# Patient Record
Sex: FEMALE | Race: WHITE | Employment: OTHER | URBAN - METROPOLITAN AREA
[De-identification: names, ages, dates, MRNs, and addresses within clinical notes are randomized per-mention and may not be internally consistent; named-entity substitution may affect disease eponyms.]

---

## 2017-01-30 RX ORDER — NITROGLYCERIN 0.4 MG/1
0.4 TABLET SUBLINGUAL EVERY 5 MIN PRN
Qty: 25 TABLET | Refills: 2 | Status: SHIPPED | OUTPATIENT
Start: 2017-01-30 | End: 2017-03-08 | Stop reason: SDUPTHER

## 2017-03-08 ENCOUNTER — OFFICE VISIT (OUTPATIENT)
Dept: CARDIOLOGY CLINIC | Age: 72
End: 2017-03-08

## 2017-03-08 VITALS
SYSTOLIC BLOOD PRESSURE: 174 MMHG | HEIGHT: 63 IN | DIASTOLIC BLOOD PRESSURE: 80 MMHG | WEIGHT: 139.8 LBS | BODY MASS INDEX: 24.77 KG/M2 | HEART RATE: 62 BPM

## 2017-03-08 DIAGNOSIS — I25.10 CORONARY ARTERY DISEASE INVOLVING NATIVE CORONARY ARTERY OF NATIVE HEART WITHOUT ANGINA PECTORIS: Primary | ICD-10-CM

## 2017-03-08 DIAGNOSIS — Z98.61 HISTORY OF PTCA 1: ICD-10-CM

## 2017-03-08 PROCEDURE — 99214 OFFICE O/P EST MOD 30 MIN: CPT | Performed by: INTERNAL MEDICINE

## 2017-03-08 RX ORDER — ISOSORBIDE MONONITRATE 60 MG/1
90 TABLET, EXTENDED RELEASE ORAL DAILY
Qty: 180 TABLET | Refills: 2 | Status: SHIPPED | OUTPATIENT
Start: 2017-03-08 | End: 2018-07-02 | Stop reason: SDUPTHER

## 2017-03-08 RX ORDER — ISOSORBIDE MONONITRATE 60 MG/1
90 TABLET, EXTENDED RELEASE ORAL DAILY
Qty: 90 TABLET | Refills: 2 | Status: SHIPPED
Start: 2017-03-08 | End: 2017-03-08 | Stop reason: SDUPTHER

## 2017-03-08 RX ORDER — NITROGLYCERIN 0.4 MG/1
0.4 TABLET SUBLINGUAL EVERY 5 MIN PRN
Qty: 25 TABLET | Refills: 2 | Status: SHIPPED | OUTPATIENT
Start: 2017-03-08

## 2017-12-08 ENCOUNTER — OFFICE VISIT (OUTPATIENT)
Dept: CARDIOLOGY CLINIC | Age: 72
End: 2017-12-08

## 2017-12-08 VITALS
WEIGHT: 138.4 LBS | HEART RATE: 60 BPM | DIASTOLIC BLOOD PRESSURE: 70 MMHG | BODY MASS INDEX: 24.52 KG/M2 | SYSTOLIC BLOOD PRESSURE: 148 MMHG | HEIGHT: 63 IN

## 2017-12-08 DIAGNOSIS — Z98.61 HISTORY OF PTCA 1: ICD-10-CM

## 2017-12-08 DIAGNOSIS — I25.10 CORONARY ARTERY DISEASE INVOLVING NATIVE CORONARY ARTERY OF NATIVE HEART WITHOUT ANGINA PECTORIS: Primary | ICD-10-CM

## 2017-12-08 PROCEDURE — 99214 OFFICE O/P EST MOD 30 MIN: CPT | Performed by: INTERNAL MEDICINE

## 2017-12-08 RX ORDER — SERTRALINE HYDROCHLORIDE 100 MG/1
100 TABLET, FILM COATED ORAL DAILY
COMMUNITY
End: 2018-03-22 | Stop reason: DRUGHIGH

## 2017-12-08 NOTE — PROGRESS NOTES
CARDIOLOGY NOTE      12/8/2017    RE: Dasha Ramirez  (1945)                               TO:  Dr. Jessica Celeste MD      Thank you for involving me in taking care of your  patient Dasha Ramirez, who is a  74y.o. year old      female with past medical  history of  Cad, htn, hyperlipidimea  is  seen today Patient  during this  visit c/o occasional recurrent palpitations, no CP for a few weeks. Vitals:    12/08/17 1002   BP: (!) 148/70   Pulse:        Current Outpatient Prescriptions   Medication Sig Dispense Refill    sertraline (ZOLOFT) 100 MG tablet Take 100 mg by mouth daily      nitroGLYCERIN (NITROSTAT) 0.4 MG SL tablet Place 1 tablet under the tongue every 5 minutes as needed for Chest pain 25 tablet 2    isosorbide mononitrate (IMDUR) 60 MG extended release tablet Take 1.5 tablets by mouth daily 180 tablet 2    metoprolol (LOPRESSOR) 50 MG tablet Take 1 tablet by mouth 2 times daily 90 tablet 2    alendronate (FOSAMAX) 70 MG tablet   Take 70 mg by mouth every 7 days       Omega-3 Fatty Acids (FISH OIL) 1000 MG CAPS Take 1,000 mg by mouth 2 times daily      Multiple Vitamins-Minerals (MULTIVITAMIN PO) Take 1 tablet by mouth daily      omeprazole (PRILOSEC) 40 MG capsule Take 40 mg by mouth daily.  calcium carbonate 600 MG TABS tablet Take 1 tablet by mouth 2 times daily.  hyoscyamine (LEVSIN/SL) 0.125 MG SL tablet Place 0.125 mg under the tongue every 4 hours as needed.  Bulk Chemicals (LYSINE ACETATE) by Does not apply route.  pravastatin (PRAVACHOL) 40 MG tablet Take 1 tablet by mouth daily. 90 tablet 3    aspirin 81 MG tablet Take 81 mg by mouth daily. No current facility-administered medications for this visit.       Allergies: Naproxen  Past Medical History:   Diagnosis Date    Arthritis     Blood transfusion     CAD (coronary artery disease)     H/O cardiac catheterization     3-3-2010 normal  stent present in LAD patent-rest of vessels carotid bruits. Cardiovascular  Normal S1 and S2 without obvious murmur or gallop. Extremities - No cyanosis, clubbing, or significant edema. Pulmonary  No respiratory distress. No wheezes or rales. Pulses: Bilateral radial and pedal pulses normal  Abdomen  no tenderness  Musculoskeletal  normal strength  Neurologic    There are  no gross focal neurologic abnormalities. Skin-  No rash  Affect; normal mood    DATA:  No results found for: CKTOTAL, CKMB, CKMBINDEX, TROPONINI  BNP:  No results found for: BNP  PT/INR:  No results found for: PTINR  No results found for: LABA1C  Lab Results   Component Value Date    CHOL 149 07/05/2016    TRIG 175 07/05/2016    HDL 43 07/05/2016    LDLCALC 71 07/05/2016    LDLDIRECT 73 09/25/2012     Lab Results   Component Value Date    ALT 14 07/05/2016    AST 22 07/05/2016     TSH:  No results found for: TSH      QUALITY MEASURES:  CAD:  Yes   CHOL LOWERING:  Yes- if No Why  ANTIPLATELET:  Yes - if No why  BETA BLOCKER    yes,   IF  NO WHY  SMOKING HISTORY no COUNSELLED no  ATRIAL FIBRILLATIONno ANTICOAG: no    Assessment/ Plan:     Patient seen , interviewed and examined                 -     CORONARY ARTERY DISEASE: Patient  currently as per anginal classification has   No symptoms           - changes in  treatment:   no  All CAD tests available in records reviewed. -  Hypertension: Patients blood pressure is normal. Patient is advised about low sodium diet. Present medical regimen will not be changed. -  LIPID MANAGEMENT:  Available lipid  lab data reviewed  and patient was given dietary advice. NCEP- ATP III guidelines reviewed with patient.     -   Changes  in medicines made: No        - Palpitations DC caffeine

## 2018-03-22 ENCOUNTER — OFFICE VISIT (OUTPATIENT)
Dept: CARDIOLOGY CLINIC | Age: 73
End: 2018-03-22

## 2018-03-22 VITALS
WEIGHT: 140 LBS | HEART RATE: 68 BPM | BODY MASS INDEX: 24.8 KG/M2 | DIASTOLIC BLOOD PRESSURE: 64 MMHG | SYSTOLIC BLOOD PRESSURE: 136 MMHG | HEIGHT: 63 IN

## 2018-03-22 DIAGNOSIS — R07.9 CHEST PAIN, UNSPECIFIED TYPE: Primary | ICD-10-CM

## 2018-03-22 PROCEDURE — 93000 ELECTROCARDIOGRAM COMPLETE: CPT | Performed by: INTERNAL MEDICINE

## 2018-03-22 PROCEDURE — 99214 OFFICE O/P EST MOD 30 MIN: CPT | Performed by: INTERNAL MEDICINE

## 2018-03-23 ENCOUNTER — PROCEDURE VISIT (OUTPATIENT)
Dept: CARDIOLOGY CLINIC | Age: 73
End: 2018-03-23

## 2018-03-23 DIAGNOSIS — R07.9 CHEST PAIN, UNSPECIFIED TYPE: ICD-10-CM

## 2018-03-23 LAB
LV EF: 70 %
LVEF MODALITY: NORMAL

## 2018-03-23 PROCEDURE — A9500 TC99M SESTAMIBI: HCPCS | Performed by: INTERNAL MEDICINE

## 2018-03-23 PROCEDURE — 93015 CV STRESS TEST SUPVJ I&R: CPT | Performed by: INTERNAL MEDICINE

## 2018-03-23 PROCEDURE — 78452 HT MUSCLE IMAGE SPECT MULT: CPT | Performed by: INTERNAL MEDICINE

## 2018-03-26 ENCOUNTER — TELEPHONE (OUTPATIENT)
Dept: CARDIOLOGY CLINIC | Age: 73
End: 2018-03-26

## 2018-04-25 ENCOUNTER — OFFICE VISIT (OUTPATIENT)
Dept: CARDIOLOGY CLINIC | Age: 73
End: 2018-04-25

## 2018-04-25 VITALS
WEIGHT: 142 LBS | HEART RATE: 60 BPM | HEIGHT: 63 IN | SYSTOLIC BLOOD PRESSURE: 134 MMHG | DIASTOLIC BLOOD PRESSURE: 78 MMHG | BODY MASS INDEX: 25.16 KG/M2

## 2018-04-25 DIAGNOSIS — Z98.61 HISTORY OF PTCA 1: ICD-10-CM

## 2018-04-25 DIAGNOSIS — R07.9 CHEST PAIN, UNSPECIFIED TYPE: Primary | ICD-10-CM

## 2018-04-25 PROCEDURE — 99213 OFFICE O/P EST LOW 20 MIN: CPT | Performed by: INTERNAL MEDICINE

## 2018-07-03 RX ORDER — ISOSORBIDE MONONITRATE 60 MG/1
90 TABLET, EXTENDED RELEASE ORAL DAILY
Qty: 45 TABLET | Refills: 11 | Status: SHIPPED | OUTPATIENT
Start: 2018-07-03

## 2018-09-18 LAB
ALBUMIN SERPL-MCNC: 4.8 G/DL
ALP BLD-CCNC: 68 U/L
ALT SERPL-CCNC: 17 U/L
ANION GAP SERPL CALCULATED.3IONS-SCNC: NORMAL MMOL/L
AST SERPL-CCNC: 20 U/L
BILIRUB SERPL-MCNC: 0.4 MG/DL (ref 0.1–1.4)
BUN BLDV-MCNC: 13 MG/DL
CALCIUM SERPL-MCNC: 9.4 MG/DL
CHLORIDE BLD-SCNC: 102 MMOL/L
CHOLESTEROL, TOTAL: 164 MG/DL
CHOLESTEROL/HDL RATIO: NORMAL
CO2: 26 MMOL/L
CREAT SERPL-MCNC: 0.7 MG/DL
GFR CALCULATED: NORMAL
GLUCOSE BLD-MCNC: 106 MG/DL
HDLC SERPL-MCNC: 39 MG/DL (ref 35–70)
LDL CHOLESTEROL CALCULATED: 76 MG/DL (ref 0–160)
POTASSIUM SERPL-SCNC: 4.2 MMOL/L
SODIUM BLD-SCNC: 143 MMOL/L
TOTAL PROTEIN: 6.8
TRIGL SERPL-MCNC: 245 MG/DL
VLDLC SERPL CALC-MCNC: 49 MG/DL

## 2019-01-16 ENCOUNTER — OFFICE VISIT (OUTPATIENT)
Dept: CARDIOLOGY CLINIC | Age: 74
End: 2019-01-16
Payer: COMMERCIAL

## 2019-01-16 VITALS
HEIGHT: 63 IN | WEIGHT: 143 LBS | DIASTOLIC BLOOD PRESSURE: 74 MMHG | HEART RATE: 64 BPM | BODY MASS INDEX: 25.34 KG/M2 | SYSTOLIC BLOOD PRESSURE: 122 MMHG

## 2019-01-16 DIAGNOSIS — Z98.61 HISTORY OF PTCA 1: Primary | ICD-10-CM

## 2019-01-16 PROCEDURE — 99213 OFFICE O/P EST LOW 20 MIN: CPT | Performed by: INTERNAL MEDICINE

## 2019-03-18 LAB
CHOLESTEROL, TOTAL: 1465 MG/DL
CHOLESTEROL/HDL RATIO: NORMAL
HDLC SERPL-MCNC: 38 MG/DL (ref 35–70)
LDL CHOLESTEROL CALCULATED: 94 MG/DL (ref 0–160)
TRIGL SERPL-MCNC: 167 MG/DL
VLDLC SERPL CALC-MCNC: 33 MG/DL

## 2019-04-18 NOTE — PROGRESS NOTES
1. Do not eat or drink anything after 12 midnight (or____hours) unless instructed by your doctor prior to surgery. This includes no water, chewing gum or mints. 2. Follow your directions as prescribed by the doctor for your procedure and medications. 3.Check with your Doctor regarding stopping Plavix, Coumadin, Lovenox,Effient,Pradaxa,Xarelto, Fragmin or other blood thinners and follow their instructions. 4. Do not smoke, and do not drink any alcoholic beverages 24 hours prior to surgery. This includes NA Beer. 5. You may brush your teeth and gargle the morning of surgery. DO NOT SWALLOW WATER   6. You MUST make arrangements for a responsible adult to take you home after your surgery and be able to check on you every couple hours for the day. You will not be allowed to leave alone or drive yourself home. It is strongly suggested someone stay with you the first 24 hrs. Your surgery will be cancelled if you do not have a ride home. 7. Please wear simple, loose fitting clothing to the hospital.  Gayle Majors not bring valuables (money, credit cards, checkbooks, etc.) Do not wear any makeup (including no eye makeup) or nail polish on your fingers or toes. 8. DO NOT wear any jewelry or piercings on day of surgery. All body piercing jewelry must be removed. 9. If you have dentures, they will be removed before going to the OR; we will provide you a container. If you wear contact lenses or glasses, they will be removed; please bring a case for them. 10. If you  have a Living Will and Durable Power of  for Healthcare, please bring in a copy. 11 Please bring picture ID,  insurance card, paperwork from the doctors office    (H & P, Consent, & card for implantable devices).

## 2019-05-02 NOTE — PROGRESS NOTES
.1. Hx of anesthesia complications? --N  2. Hx of difficult airway/intubation? --N  3. Hx of changed chest pain/CHF exacerbation in last 6 mo. ? --N  4. Home O2 dependent? --N  5.  Able to climb one flight of stairs w/o SOB? --Y  6. Recent COPD exacerbation or pneumonia/bronchitis that has been treated in last      month? --N

## 2019-05-02 NOTE — PROGRESS NOTES
.   1. Do not eat or drink anything after 0830 on 5/9- unless instructed by your doctor prior to surgery. This includes no water, chewing gum or mints. 2. Follow your directions as prescribed by the doctor for your procedure and medications. 3.Check with your Doctor regarding stopping Plavix, Coumadin, Lovenox,Effient,Pradaxa,Xarelto, Fragmin or other blood thinners and follow their instructions. 4. Do not smoke, and do not drink any alcoholic beverages 24 hours prior to surgery. This includes NA Beer. 5. You may brush your teeth and gargle the morning of surgery. DO NOT SWALLOW WATER   6. You MUST make arrangements for a responsible adult to take you home after your surgery and be able to check on you every couple hours for the day. You will not be allowed to leave alone or drive yourself home. It is strongly suggested someone stay with you the first 24 hrs. Your surgery will be cancelled if you do not have a ride home. 7. Please wear simple, loose fitting clothing to the hospital.  Equilla Ort not bring valuables (money, credit cards, checkbooks, etc.) Do not wear any makeup (including no eye makeup) or nail polish on your fingers or toes. 8. DO NOT wear any jewelry or piercings on day of surgery. All body piercing jewelry must be removed. 9. If you have dentures, they will be removed before going to the OR; we will provide you a container. If you wear contact lenses or glasses, they will be removed; please bring a case for them. 10. If you  have a Living Will and Durable Power of  for Healthcare, please bring in a copy. 11 Please bring picture ID,  insurance card, paperwork from the doctors office    (H & P, Consent, & card for implantable devices).

## 2019-05-07 ENCOUNTER — ANESTHESIA EVENT (OUTPATIENT)
Dept: OPERATING ROOM | Age: 74
End: 2019-05-07
Payer: COMMERCIAL

## 2019-05-07 NOTE — ANESTHESIA PRE PROCEDURE
Department of Anesthesiology  Preprocedure Note       Name:  Ramona Powers   Age:  74 y.o.  :  1945                                          MRN:  [id number]         Date:  2019      Surgeon: Joe Hicks):  Alisa Velez MD    Procedure: COLONOSCOPY DIAGNOSTIC (N/A )    Medications prior to admission:   Prior to Admission medications    Medication Sig Start Date End Date Taking? Authorizing Provider   isosorbide mononitrate (IMDUR) 60 MG extended release tablet Take 1.5 tablets by mouth daily 7/3/18   Shaji Carrington MD   nitroGLYCERIN (NITROSTAT) 0.4 MG SL tablet Place 1 tablet under the tongue every 5 minutes as needed for Chest pain 3/8/17   Shaji Carrington MD   metoprolol (LOPRESSOR) 50 MG tablet Take 1 tablet by mouth 2 times daily 16   Shaji Carrington MD   alendronate (FOSAMAX) 70 MG tablet   Take 70 mg by mouth every 7 days  5/11/15   Historical Provider, MD   Omega-3 Fatty Acids (FISH OIL) 1000 MG CAPS Take 1,000 mg by mouth 2 times daily    Historical Provider, MD   Multiple Vitamins-Minerals (MULTIVITAMIN PO) Take 1 tablet by mouth daily    Historical Provider, MD   omeprazole (PRILOSEC) 40 MG capsule Take 40 mg by mouth daily. Historical Provider, MD   calcium carbonate 600 MG TABS tablet Take 1 tablet by mouth 2 times daily. Historical Provider, MD   Bulk Chemicals (LYSINE ACETATE) by Does not apply route. Historical Provider, MD   pravastatin (PRAVACHOL) 40 MG tablet Take 1 tablet by mouth daily. 12   Shaji Carrington MD   aspirin 81 MG tablet Take 81 mg by mouth daily. Historical Provider, MD       Current medications:    No current facility-administered medications for this encounter.       Current Outpatient Medications   Medication Sig Dispense Refill    isosorbide mononitrate (IMDUR) 60 MG extended release tablet Take 1.5 tablets by mouth daily 45 tablet 11    nitroGLYCERIN (NITROSTAT) 0.4 MG SL tablet Place 1 tablet under the tongue every 5 minutes as needed for Chest pain 25 tablet 2    metoprolol (LOPRESSOR) 50 MG tablet Take 1 tablet by mouth 2 times daily 90 tablet 2    alendronate (FOSAMAX) 70 MG tablet   Take 70 mg by mouth every 7 days       Omega-3 Fatty Acids (FISH OIL) 1000 MG CAPS Take 1,000 mg by mouth 2 times daily      Multiple Vitamins-Minerals (MULTIVITAMIN PO) Take 1 tablet by mouth daily      omeprazole (PRILOSEC) 40 MG capsule Take 40 mg by mouth daily.  calcium carbonate 600 MG TABS tablet Take 1 tablet by mouth 2 times daily.  Bulk Chemicals (LYSINE ACETATE) by Does not apply route.  pravastatin (PRAVACHOL) 40 MG tablet Take 1 tablet by mouth daily. 90 tablet 3    aspirin 81 MG tablet Take 81 mg by mouth daily. Allergies: Allergies   Allergen Reactions    Naproxen Swelling       Problem List:    Patient Active Problem List   Diagnosis Code    CAD (coronary artery disease) I25.10    History of PTCA 1 Z98.61    Hyperlipidemia E78.5    H/O cardiac catheterization Z98.890    Chest pain R07.9    HTN (hypertension) I10       Past Medical History:        Diagnosis Date    Arthritis     Blood transfusion     CAD (coronary artery disease)     H/O cardiac catheterization     3-3-2010 normal  stent present in LAD patent-rest of vessels w/out disease     History of nuclear stress test 08/30/2016    cardiolite-normal,EF69%    History of PTCA 1     3- PTCA w/ Taxus stent to LAD; PTCA of diagonal vessel    HTN (hypertension)     Hx of cardiac arrhythmia     hx given by patient ? what    Hx of cardiovascular stress test 6/4/2015    lexiscan-normal,EF70%    Hx of cardiovascular stress test 03/23/2018    EF 70%  Normal study.  Hx of echocardiogram     3-2010 EF>55%; 9-2006    Hyperlipidemia     IBS (irritable bowel syndrome)     with constipation       Past Surgical History:        Procedure Laterality Date    ABDOMEN SURGERY  1960's    Exploratory Lap, cyst ruptured.     ABDOMINAL EXPLORATION SURGERY      ovarian cyst removed    BREAST ENHANCEMENT SURGERY      b/l    BREAST LUMPECTOMY  1984    left     COLONOSCOPY  2012    Normal exam    SINUS SURGERY         Social History:    Social History     Tobacco Use    Smoking status: Former Smoker     Last attempt to quit: 1984     Years since quittin.3    Smokeless tobacco: Never Used   Substance Use Topics    Alcohol use: Yes     Alcohol/week: 0.0 oz     Comment: rarely     CAFFEINE-[employer] daily                                Counseling given: Not Answered      Vital Signs (Current):   Vitals:    19 1244 19 1412   Weight: 140 lb (63.5 kg) 140 lb (63.5 kg)   Height: 5' 3\" (1.6 m) 5' 3\" (1.6 m)                                              BP Readings from Last 3 Encounters:   19 122/74   18 134/78   18 136/64       NPO Status:                                               24 hrs. Solids                       6 hrs. clears                                  BMI:   Wt Readings from Last 3 Encounters:   19 143 lb (64.9 kg)   18 142 lb (64.4 kg)   18 140 lb (63.5 kg)     Body mass index is 24.8 kg/m². CBC: No results found for: WBC, RBC, HGB, HCT, MCV, RDW, PLT    CMP:   Lab Results   Component Value Date     2018    K 4.2 2018     2018    CO2 26 2018    BUN 13 2018    CREATININE 0.7 2018    GFRAA >60 2012    LABGLOM >60 2012    GLUCOSE 106 2018    PROT 6.6 2018    CALCIUM 9.4 2018    BILITOT 0.4 2018    ALKPHOS 68 2018    AST 20 2018    ALT 17 2018       POC Tests: No results for input(s): POCGLU, POCNA, POCK, POCCL, POCBUN, POCHEMO, POCHCT in the last 72 hours.     Coags: No results found for: PROTIME, INR, APTT    HCG (If Applicable): No results found for: PREGTESTUR, PREGSERUM, HCG, HCGQUANT     ABGs: No results found for: PHART, PO2ART, TJP3QUI, QHW3BPX, BEART, K0DXQWLX     Type & Screen (If Applicable):  No results found for: [facility]    Anesthesia Evaluation  Patient summary reviewed and Nursing notes reviewed no history of anesthetic complications:   Airway: Mallampati: II  TM distance: >3 FB   Neck ROM: full  Mouth opening: > = 3 FB Dental:    (+) edentulous      Pulmonary:normal exam                              ROS comment: Former Smoker  Quit Smokin84       Cardiovascular:  Exercise tolerance: good (>4 METS),   (+) hypertension:, CAD:, CABG/stent:, hyperlipidemia      ECG reviewed        Stress test reviewed       Beta Blocker:  Dose within 24 Hrs      ROS comment: 3/23/18 Stress test  Summary   Supervising physician Dr. Kourtney Bo ischemic ECG changes with Lexiscan infusion.   This is a normal study.   No infarct or ischemia noted.   Normal EF 70 % with normal ventricular contractility.      Recommendation   Clinical Correlation      Signatures      ------------------------------------------------------------------   Electronically signed by Gely Hutchison MD   (Interpreting cardiologist) on 2018 at 16:13   ------------------------------------------------------------------    HR 62 (2018)  Sinus  Rhythm   -consider old anterior infarct. ABNORMAL        Neuro/Psych:   Negative Neuro/Psych ROS              GI/Hepatic/Renal:   (+) bowel prep,          ROS comment: Rectal Bleeding. Endo/Other:    (+) : arthritis:., .                 Abdominal:           Vascular: negative vascular ROS. Anesthesia Plan      MAC     ASA 3       Induction: intravenous. Anesthetic plan and risks discussed with patient. RACHELL Phelps - CRNA   2019    Pre Anesthesia Evaluation complete. Anesthesia plan, risks, benefits, alternatives, and personnel discussed with patient and/or legal guardian. Patient and/or legal guardian verbalized an understanding and agreed to proceed.  Anesthesia plan discussed with care team members and agreed upon.   RACHELL Patricio - CRNA  5/9/2019

## 2019-05-09 ENCOUNTER — ANESTHESIA (OUTPATIENT)
Dept: OPERATING ROOM | Age: 74
End: 2019-05-09
Payer: COMMERCIAL

## 2019-05-09 ENCOUNTER — HOSPITAL ENCOUNTER (OUTPATIENT)
Age: 74
Setting detail: OUTPATIENT SURGERY
Discharge: HOME | End: 2019-05-09
Attending: SPECIALIST | Admitting: SPECIALIST
Payer: COMMERCIAL

## 2019-05-09 VITALS
WEIGHT: 136 LBS | TEMPERATURE: 97 F | BODY MASS INDEX: 24.1 KG/M2 | OXYGEN SATURATION: 100 % | RESPIRATION RATE: 16 BRPM | HEIGHT: 63 IN | HEART RATE: 66 BPM | DIASTOLIC BLOOD PRESSURE: 91 MMHG | SYSTOLIC BLOOD PRESSURE: 140 MMHG

## 2019-05-09 VITALS — SYSTOLIC BLOOD PRESSURE: 130 MMHG | DIASTOLIC BLOOD PRESSURE: 53 MMHG | OXYGEN SATURATION: 97 %

## 2019-05-09 PROCEDURE — 7100000010 HC PHASE II RECOVERY - FIRST 15 MIN: Performed by: SPECIALIST

## 2019-05-09 PROCEDURE — 7100000011 HC PHASE II RECOVERY - ADDTL 15 MIN: Performed by: SPECIALIST

## 2019-05-09 PROCEDURE — 2709999900 HC NON-CHARGEABLE SUPPLY: Performed by: SPECIALIST

## 2019-05-09 PROCEDURE — 3609010600 HC COLONOSCOPY POLYPECTOMY SNARE/COLD BIOPSY: Performed by: SPECIALIST

## 2019-05-09 PROCEDURE — 6360000002 HC RX W HCPCS: Performed by: NURSE ANESTHETIST, CERTIFIED REGISTERED

## 2019-05-09 PROCEDURE — 3700000000 HC ANESTHESIA ATTENDED CARE: Performed by: SPECIALIST

## 2019-05-09 PROCEDURE — 3700000001 HC ADD 15 MINUTES (ANESTHESIA): Performed by: SPECIALIST

## 2019-05-09 PROCEDURE — 2580000003 HC RX 258: Performed by: SPECIALIST

## 2019-05-09 PROCEDURE — 2500000003 HC RX 250 WO HCPCS: Performed by: NURSE ANESTHETIST, CERTIFIED REGISTERED

## 2019-05-09 PROCEDURE — 88305 TISSUE EXAM BY PATHOLOGIST: CPT

## 2019-05-09 RX ORDER — LIDOCAINE HYDROCHLORIDE 20 MG/ML
INJECTION, SOLUTION EPIDURAL; INFILTRATION; INTRACAUDAL; PERINEURAL PRN
Status: DISCONTINUED | OUTPATIENT
Start: 2019-05-09 | End: 2019-05-09 | Stop reason: SDUPTHER

## 2019-05-09 RX ORDER — PROPOFOL 10 MG/ML
INJECTION, EMULSION INTRAVENOUS PRN
Status: DISCONTINUED | OUTPATIENT
Start: 2019-05-09 | End: 2019-05-09 | Stop reason: SDUPTHER

## 2019-05-09 RX ORDER — ONDANSETRON 2 MG/ML
INJECTION INTRAMUSCULAR; INTRAVENOUS PRN
Status: DISCONTINUED | OUTPATIENT
Start: 2019-05-09 | End: 2019-05-09 | Stop reason: SDUPTHER

## 2019-05-09 RX ORDER — SODIUM CHLORIDE, SODIUM LACTATE, POTASSIUM CHLORIDE, CALCIUM CHLORIDE 600; 310; 30; 20 MG/100ML; MG/100ML; MG/100ML; MG/100ML
INJECTION, SOLUTION INTRAVENOUS CONTINUOUS
Status: DISCONTINUED | OUTPATIENT
Start: 2019-05-09 | End: 2019-05-09 | Stop reason: HOSPADM

## 2019-05-09 RX ADMIN — PROPOFOL 20 MG: 10 INJECTION, EMULSION INTRAVENOUS at 12:28

## 2019-05-09 RX ADMIN — SODIUM CHLORIDE, POTASSIUM CHLORIDE, SODIUM LACTATE AND CALCIUM CHLORIDE: 600; 310; 30; 20 INJECTION, SOLUTION INTRAVENOUS at 10:43

## 2019-05-09 RX ADMIN — PROPOFOL 20 MG: 10 INJECTION, EMULSION INTRAVENOUS at 12:34

## 2019-05-09 RX ADMIN — PROPOFOL 20 MG: 10 INJECTION, EMULSION INTRAVENOUS at 12:18

## 2019-05-09 RX ADMIN — PROPOFOL 50 MG: 10 INJECTION, EMULSION INTRAVENOUS at 12:13

## 2019-05-09 RX ADMIN — PROPOFOL 20 MG: 10 INJECTION, EMULSION INTRAVENOUS at 12:26

## 2019-05-09 RX ADMIN — PROPOFOL 20 MG: 10 INJECTION, EMULSION INTRAVENOUS at 12:20

## 2019-05-09 RX ADMIN — PROPOFOL 20 MG: 10 INJECTION, EMULSION INTRAVENOUS at 12:22

## 2019-05-09 RX ADMIN — PROPOFOL 20 MG: 10 INJECTION, EMULSION INTRAVENOUS at 12:14

## 2019-05-09 RX ADMIN — ONDANSETRON 4 MG: 2 INJECTION INTRAMUSCULAR; INTRAVENOUS at 12:34

## 2019-05-09 RX ADMIN — PROPOFOL 20 MG: 10 INJECTION, EMULSION INTRAVENOUS at 12:15

## 2019-05-09 RX ADMIN — PROPOFOL 20 MG: 10 INJECTION, EMULSION INTRAVENOUS at 12:16

## 2019-05-09 RX ADMIN — PROPOFOL 20 MG: 10 INJECTION, EMULSION INTRAVENOUS at 12:31

## 2019-05-09 RX ADMIN — LIDOCAINE HYDROCHLORIDE 100 MG: 20 INJECTION, SOLUTION EPIDURAL; INFILTRATION; INTRACAUDAL; PERINEURAL at 12:13

## 2019-05-09 RX ADMIN — PROPOFOL 20 MG: 10 INJECTION, EMULSION INTRAVENOUS at 12:24

## 2019-05-09 ASSESSMENT — PAIN SCALES - GENERAL
PAINLEVEL_OUTOF10: 0
PAINLEVEL_OUTOF10: 0

## 2019-05-09 ASSESSMENT — PAIN - FUNCTIONAL ASSESSMENT: PAIN_FUNCTIONAL_ASSESSMENT: 0-10

## 2019-05-09 NOTE — PROGRESS NOTES
[phone] denies needs, head of bed up. Call light in reach. Dr Suzie Leyva provided update at bedside, friend at bedside  1302 warm blanket provided.   1306 denies needs  1318 pt dressing  8647 discharged to car

## 2019-05-09 NOTE — ANESTHESIA POSTPROCEDURE EVALUATION
Department of Anesthesiology  Postprocedure Note    Patient: Angela Back  MRN: [id number]  YOB: 1945  Date of evaluation: 5/9/2019  Time:  12:49 PM     Procedure Summary     Date:  05/09/19 Room / Location:  [place] [address] 05 / 1200 [facility] OR    Anesthesia Start:  2618 Anesthesia Stop:  9890    Procedure:  COLONOSCOPY POLYPECTOMY SNARE/COLD BIOPSY (N/A ) Diagnosis:  (Rectal Bleeding)    Surgeon:  Ashlee Keyes MD Responsible Provider:  Kin Ormond, APRN - CRNA    Anesthesia Type:  MAC ASA Status:  3          Anesthesia Type: MAC    Samira Phase I:  10    Samira Phase II:  10    Last vitals: Reviewed and per EMR flowsheets.        Anesthesia Post Evaluation    Patient location during evaluation: bedside  Patient participation: complete - patient participated  Level of consciousness: awake and alert  Pain score: 0  Airway patency: patent  Nausea & Vomiting: no nausea and no vomiting  Complications: no  Cardiovascular status: hemodynamically stable  Respiratory status: acceptable, nonlabored ventilation, spontaneous ventilation and room air  Hydration status: euvolemic

## 2019-05-09 NOTE — BRIEF OP NOTE
BRIEF COLONOSCOPY REPORT:    Pre-op Diagnosis: rectal bleeding    Post-op Diagnosis: see impression below    Anesthesia: MAC  Estimated blood loss: less than 50 cc  Implants: none  Drains: none  Complications: none  Specimens- none- or as referred to below     Impression:    1) internal hemorrhoids    2) 6 small (2-6 mm) polyps removed   3) somewhat nodular terminal ileum of doubtful significance- biopsied        Suggest:   1) The interval for repeat colonoscopy will be determined by the path report     The complete operative report (including photos) is available in the following locations:   1) soft chart now   2) report will also be scanned and can then be found by going to \"chart review\" then \"notes\" then \"op report\" or by going to [facility] review\" then \"media\" then \"op report\". For review of photos, may need to go to page 2.

## 2019-05-09 NOTE — H&P
Original H &P in soft chart. I have examined the patient immediately before the procedure and there is no change in the previous history  and physical exam, which has been reviewed. There is no history of sleep apnea, snoring, or stridor. There has been no  previous adverse experience with sedation/anesthesia. There is no increased risk for aspiration of gastric contents. The patient has been instructed that all resuscitative measures (during the operative and immediate perioperative period) will be instituted in the unlikely event that they will be needed. The patient has no pertinent past surgical or family history other than listed in the original H&P.     ASA Class: 3  AIRWAY Class: 1

## 2019-06-06 ENCOUNTER — APPOINTMENT (OUTPATIENT)
Dept: GENERAL RADIOLOGY | Age: 74
End: 2019-06-06
Payer: COMMERCIAL

## 2019-06-06 ENCOUNTER — APPOINTMENT (OUTPATIENT)
Dept: CT IMAGING | Age: 74
End: 2019-06-06
Payer: COMMERCIAL

## 2019-06-06 ENCOUNTER — HOSPITAL ENCOUNTER (EMERGENCY)
Age: 74
Discharge: HOME | End: 2019-06-07
Attending: EMERGENCY MEDICINE
Payer: COMMERCIAL

## 2019-06-06 DIAGNOSIS — R11.2 NAUSEA AND VOMITING IN ADULT: Primary | ICD-10-CM

## 2019-06-06 LAB
ALBUMIN SERPL-MCNC: 4.7 GM/DL (ref 3.4–5)
ALP BLD-CCNC: 68 IU/L (ref 40–129)
ALT SERPL-CCNC: 13 U/L (ref 10–40)
ANION GAP SERPL CALCULATED.3IONS-SCNC: 8 MMOL/L (ref 4–16)
AST SERPL-CCNC: 20 IU/L (ref 15–37)
BASOPHILS ABSOLUTE: 0 K/CU MM
BASOPHILS RELATIVE PERCENT: 0.4 % (ref 0–1)
BILIRUB SERPL-MCNC: 0.6 MG/DL (ref 0–1)
BUN BLDV-MCNC: 20 MG/DL (ref 6–23)
CALCIUM SERPL-MCNC: 9.3 MG/DL (ref 8.3–10.6)
CHLORIDE BLD-SCNC: 103 MMOL/L (ref 99–110)
CO2: 31 MMOL/L (ref 21–32)
CREAT SERPL-MCNC: 0.7 MG/DL (ref 0.6–1.1)
DIFFERENTIAL TYPE: ABNORMAL
EOSINOPHILS ABSOLUTE: 0.1 K/CU MM
EOSINOPHILS RELATIVE PERCENT: 0.6 % (ref 0–3)
GFR AFRICAN AMERICAN: >60 ML/MIN/1.73M2
GFR NON-AFRICAN AMERICAN: >60 ML/MIN/1.73M2
GLUCOSE BLD-MCNC: 121 MG/DL (ref 70–99)
HCT VFR BLD CALC: 41.9 % (ref 37–47)
HEMOGLOBIN: 13.6 GM/DL (ref 12.5–16)
IMMATURE NEUTROPHIL %: 0.1 % (ref 0–0.43)
LIPASE: 47 IU/L (ref 13–60)
LYMPHOCYTES ABSOLUTE: 0.5 K/CU MM
LYMPHOCYTES RELATIVE PERCENT: 6.3 % (ref 24–44)
MCH RBC QN AUTO: 28.7 PG (ref 27–31)
MCHC RBC AUTO-ENTMCNC: 32.5 % (ref 32–36)
MCV RBC AUTO: 88.4 FL (ref 78–100)
MONOCYTES ABSOLUTE: 0.4 K/CU MM
MONOCYTES RELATIVE PERCENT: 5.1 % (ref 0–4)
PDW BLD-RTO: 12.4 % (ref 11.7–14.9)
PLATELET # BLD: 191 K/CU MM (ref 140–440)
PMV BLD AUTO: 10.5 FL (ref 7.5–11.1)
POTASSIUM SERPL-SCNC: 4.2 MMOL/L (ref 3.5–5.1)
RBC # BLD: 4.74 M/CU MM (ref 4.2–5.4)
SEGMENTED NEUTROPHILS ABSOLUTE COUNT: 6.8 K/CU MM
SEGMENTED NEUTROPHILS RELATIVE PERCENT: 87.5 % (ref 36–66)
SODIUM BLD-SCNC: 142 MMOL/L (ref 135–145)
TOTAL IMMATURE NEUTOROPHIL: 0.01 K/CU MM
TOTAL PROTEIN: 7.4 GM/DL (ref 6.4–8.2)
WBC # BLD: 7.8 K/CU MM (ref 4–10.5)

## 2019-06-06 PROCEDURE — 83690 ASSAY OF LIPASE: CPT

## 2019-06-06 PROCEDURE — 6360000002 HC RX W HCPCS: Performed by: EMERGENCY MEDICINE

## 2019-06-06 PROCEDURE — 93005 ELECTROCARDIOGRAM TRACING: CPT | Performed by: EMERGENCY MEDICINE

## 2019-06-06 PROCEDURE — 2580000003 HC RX 258: Performed by: EMERGENCY MEDICINE

## 2019-06-06 PROCEDURE — 74018 RADEX ABDOMEN 1 VIEW: CPT

## 2019-06-06 PROCEDURE — 96375 TX/PRO/DX INJ NEW DRUG ADDON: CPT

## 2019-06-06 PROCEDURE — 6370000000 HC RX 637 (ALT 250 FOR IP): Performed by: EMERGENCY MEDICINE

## 2019-06-06 PROCEDURE — 80053 COMPREHEN METABOLIC PANEL: CPT

## 2019-06-06 PROCEDURE — 74177 CT ABD & PELVIS W/CONTRAST: CPT

## 2019-06-06 PROCEDURE — 85025 COMPLETE CBC W/AUTO DIFF WBC: CPT

## 2019-06-06 PROCEDURE — 96374 THER/PROPH/DIAG INJ IV PUSH: CPT

## 2019-06-06 PROCEDURE — 99284 EMERGENCY DEPT VISIT MOD MDM: CPT

## 2019-06-06 RX ORDER — ONDANSETRON 4 MG/1
4 TABLET, ORALLY DISINTEGRATING ORAL ONCE
Status: COMPLETED | OUTPATIENT
Start: 2019-06-06 | End: 2019-06-06

## 2019-06-06 RX ORDER — PROMETHAZINE HYDROCHLORIDE 25 MG/ML
12.5 INJECTION, SOLUTION INTRAMUSCULAR; INTRAVENOUS ONCE
Status: COMPLETED | OUTPATIENT
Start: 2019-06-06 | End: 2019-06-06

## 2019-06-06 RX ORDER — DIPHENHYDRAMINE HYDROCHLORIDE 50 MG/ML
50 INJECTION INTRAMUSCULAR; INTRAVENOUS ONCE
Status: COMPLETED | OUTPATIENT
Start: 2019-06-06 | End: 2019-06-06

## 2019-06-06 RX ORDER — METOCLOPRAMIDE HYDROCHLORIDE 5 MG/ML
10 INJECTION INTRAMUSCULAR; INTRAVENOUS ONCE
Status: COMPLETED | OUTPATIENT
Start: 2019-06-06 | End: 2019-06-06

## 2019-06-06 RX ORDER — SODIUM CHLORIDE 9 MG/ML
INJECTION, SOLUTION INTRAVENOUS CONTINUOUS
Status: DISCONTINUED | OUTPATIENT
Start: 2019-06-06 | End: 2019-06-07 | Stop reason: HOSPADM

## 2019-06-06 RX ORDER — ONDANSETRON 4 MG/1
4 TABLET, ORALLY DISINTEGRATING ORAL EVERY 8 HOURS PRN
Qty: 15 TABLET | Refills: 0 | Status: SHIPPED | OUTPATIENT
Start: 2019-06-06 | End: 2022-09-08

## 2019-06-06 RX ORDER — PROMETHAZINE HYDROCHLORIDE 25 MG/1
25 TABLET ORAL EVERY 6 HOURS PRN
Qty: 12 TABLET | Refills: 0 | Status: SHIPPED | OUTPATIENT
Start: 2019-06-06 | End: 2019-06-09

## 2019-06-06 RX ADMIN — SODIUM CHLORIDE: 9 INJECTION, SOLUTION INTRAVENOUS at 21:06

## 2019-06-06 RX ADMIN — DIPHENHYDRAMINE HYDROCHLORIDE 50 MG: 50 INJECTION INTRAMUSCULAR; INTRAVENOUS at 23:35

## 2019-06-06 RX ADMIN — ONDANSETRON 4 MG: 4 TABLET, ORALLY DISINTEGRATING ORAL at 22:33

## 2019-06-06 RX ADMIN — SODIUM CHLORIDE: 9 INJECTION, SOLUTION INTRAVENOUS at 23:32

## 2019-06-06 RX ADMIN — PROMETHAZINE HYDROCHLORIDE 12.5 MG: 25 INJECTION INTRAMUSCULAR; INTRAVENOUS at 21:07

## 2019-06-06 RX ADMIN — METOCLOPRAMIDE 10 MG: 5 INJECTION, SOLUTION INTRAMUSCULAR; INTRAVENOUS at 23:35

## 2019-06-07 VITALS
HEART RATE: 76 BPM | DIASTOLIC BLOOD PRESSURE: 74 MMHG | SYSTOLIC BLOOD PRESSURE: 151 MMHG | OXYGEN SATURATION: 97 % | WEIGHT: 140 LBS | TEMPERATURE: 98.2 F | BODY MASS INDEX: 24.8 KG/M2 | HEIGHT: 63 IN | RESPIRATION RATE: 14 BRPM

## 2019-06-07 PROCEDURE — 6360000004 HC RX CONTRAST MEDICATION: Performed by: EMERGENCY MEDICINE

## 2019-06-07 RX ADMIN — IOPAMIDOL 100 ML: 755 INJECTION, SOLUTION INTRAVENOUS at 00:00

## 2019-06-07 ASSESSMENT — ENCOUNTER SYMPTOMS
ABDOMINAL PAIN: 1
RESPIRATORY NEGATIVE: 1
COUGH: 0
SORE THROAT: 0
DIARRHEA: 0
EYES NEGATIVE: 1
NAUSEA: 1

## 2019-06-07 NOTE — ED NOTES
Pt discharge ordered, but pt states that she still feels nauseated. Dr. Dawn Hernandez informed and Zofran ODT ordered. Pt and daughter wish to speak with physician. Dr. Dawn Hernandez informed.       Ysabel Manzanares RN  06/06/19 3236

## 2019-06-07 NOTE — ED NOTES
Pt returned for CT. States her nausea had 95% subsided. Ambulatory to the bathroom without difficulty.       Terry Santiago RN  06/07/19 5635

## 2019-06-07 NOTE — ED NOTES
Medicated as ordered for nausea and second liter 0.9 NS initiated.       Fuentes Frederick RN  06/07/19 0003

## 2019-06-07 NOTE — ED PROVIDER NOTES
The history is provided by the patient. Nausea & Vomiting   Severity:  Moderate  Duration:  6 hours  Timing:  Constant  Number of daily episodes:  8  Quality:  Stomach contents  Progression:  Unchanged  Chronicity:  New  Recent urination:  Decreased  Relieved by:  Nothing  Worsened by:  Food smell, ice chips and liquids  Ineffective treatments:  None tried  Associated symptoms: abdominal pain    Associated symptoms: no arthralgias, no chills, no cough, no diarrhea, no fever, no headaches, no myalgias, no sore throat and no URI    Abdominal pain:     Location:  Generalized    Quality: cramping        Review of Systems   Constitutional: Negative. Negative for chills and fever. HENT: Negative. Negative for sore throat. Eyes: Negative. Respiratory: Negative. Negative for cough. Cardiovascular: Negative. Gastrointestinal: Positive for abdominal pain and nausea. Negative for diarrhea. Genitourinary: Negative. Musculoskeletal: Negative. Negative for arthralgias and myalgias. Skin: Negative. Neurological: Negative. Negative for headaches. All other systems reviewed and are negative. Family History   Problem Relation Age of Onset    Heart Disease Mother     Other Father         Alzheimer's    Breast Cancer Sister      Social History     Socioeconomic History    Marital status:      Spouse name: Not on file    Number of children: 3    Years of education: Not on file    Highest education level: Not on file   Occupational History    Occupation: Nurse-retired    Social Needs    Financial resource strain: Not on file    Food insecurity:     Worry: Not on file     Inability: Not on file    Transportation needs:     Medical: Not on file     Non-medical: Not on file   Tobacco Use    Smoking status: Former Smoker     Last attempt to quit: 1984     Years since quittin.4    Smokeless tobacco: Never Used   Substance and Sexual Activity    Alcohol use:  Yes Alcohol/week: 0.0 oz     Comment: rarely     CAFFEINE-[employer] daily    Drug use: No    Sexual activity: Not on file     Comment:    Lifestyle    Physical activity:     Days per week: Not on file     Minutes per session: Not on file    Stress: Not on file   Relationships    Social connections:     Talks on phone: Not on file     Gets together: Not on file     Attends Shinto service: Not on file     Active member of club or organization: Not on file     Attends meetings of clubs or organizations: Not on file     Relationship status: Not on file    Intimate partner violence:     Fear of current or ex partner: Not on file     Emotionally abused: Not on file     Physically abused: Not on file     Forced sexual activity: Not on file   Other Topics Concern    Not on file   Social History Narrative    Not on file     Past Surgical History:   Procedure Laterality Date    ABDOMEN SURGERY  9799'Q    Exploratory Lap, cyst ruptured.     ABDOMINAL EXPLORATION SURGERY      ovarian cyst removed    BREAST ENHANCEMENT SURGERY  1985    b/l    BREAST LUMPECTOMY  1984    left     COLONOSCOPY  09/14/2012    Normal exam    COLONOSCOPY  05/09/2019    polyps, grade 1 int hem, bx of t1, call in one week    COLONOSCOPY N/A 5/9/2019    COLONOSCOPY POLYPECTOMY SNARE/COLD BIOPSY performed by Zenai Herring MD at [place] [address]       Past Medical History:   Diagnosis Date    Arthritis     Blood transfusion     CAD (coronary artery disease)     H/O cardiac catheterization     3-3-2010 normal  stent present in LAD patent-rest of vessels w/out disease     History of nuclear stress test 08/30/2016    cardiolite-normal,EF69%    History of PTCA 1     3- PTCA w/ Taxus stent to LAD; PTCA of diagonal vessel    HTN (hypertension)     Hx of cardiac arrhythmia     hx given by patient ? what    Hx of cardiovascular stress test 6/4/2015    lexiscan-normal,EF70%    Hx of cardiovascular stress test 03/23/2018    EF 70%  Normal study.  Hx of echocardiogram     3-2010 EF>55%; 9-2006    Hyperlipidemia     IBS (irritable bowel syndrome)     with constipation     Allergies   Allergen Reactions    Naproxen Swelling     Prior to Admission medications    Medication Sig Start Date End Date Taking? Authorizing Provider   promethazine (PHENERGAN) 25 MG tablet Take 1 tablet by mouth every 6 hours as needed for Nausea 6/6/19 6/9/19 Yes Crissy Laurie Ray, DO   ondansetron (ZOFRAN ODT) 4 MG disintegrating tablet Take 1 tablet by mouth every 8 hours as needed for Nausea 6/6/19  Yes Crissykim Sullivan Ray, DO   isosorbide mononitrate (IMDUR) 60 MG extended release tablet Take 1.5 tablets by mouth daily 7/3/18   Jesse Blood MD   nitroGLYCERIN (NITROSTAT) 0.4 MG SL tablet Place 1 tablet under the tongue every 5 minutes as needed for Chest pain 3/8/17   Jesse Blood MD   metoprolol (LOPRESSOR) 50 MG tablet Take 1 tablet by mouth 2 times daily 5/25/16   Jesse Blood MD   alendronate (FOSAMAX) 70 MG tablet   Take 70 mg by mouth every 7 days  5/11/15   Historical Provider, MD   Omega-3 Fatty Acids (FISH OIL) 1000 MG CAPS Take 1,000 mg by mouth 2 times daily    Historical Provider, MD   Multiple Vitamins-Minerals (MULTIVITAMIN PO) Take 1 tablet by mouth daily    Historical Provider, MD   omeprazole (PRILOSEC) 40 MG capsule Take 40 mg by mouth daily. Historical Provider, MD   calcium carbonate 600 MG TABS tablet Take 1 tablet by mouth 2 times daily. Historical Provider, MD   [employer] (LYSINE ACETATE) by Does not apply route. Historical Provider, MD   pravastatin (PRAVACHOL) 40 MG tablet Take 1 tablet by mouth daily. 12/7/12   Jesse Blood MD   aspirin 81 MG tablet Take 81 mg by mouth daily.       Historical Provider, MD       BP (!) 165/78   Pulse 76   Temp 98.2 °F (36.8 °C) (Oral)   Resp 16   Ht 5' 3\" (1.6 m)   Wt 140 lb (63.5 kg)   LMP 01/01/1998 (Approximate)   SpO2 97%   BMI 24.80 kg/m²     Physical Exam Constitutional: She is oriented to person, place, and time. She appears well-developed and well-nourished. HENT:   Head: Normocephalic and atraumatic. Right Ear: External ear normal.   Left Ear: External ear normal.   Nose: Nose normal.   Mouth/Throat: Oropharynx is clear and moist.   Eyes: Pupils are equal, round, and reactive to light. Conjunctivae and EOM are normal.   Neck: Normal range of motion. Neck supple. Cardiovascular: Normal rate, regular rhythm, normal heart sounds and intact distal pulses. Pulmonary/Chest: Effort normal and breath sounds normal.   Abdominal: Soft. Bowel sounds are normal. She exhibits distension. She exhibits no mass. There is no tenderness. There is no rebound and no guarding. Musculoskeletal: Normal range of motion. Neurological: She is alert and oriented to person, place, and time. GCS eye subscore is 4. GCS verbal subscore is 5. GCS motor subscore is 6. Skin: Skin is warm and dry. Psychiatric: She has a normal mood and affect.  Her behavior is normal. Judgment and thought content normal.       MDM:    Results for orders placed or performed during the hospital encounter of 06/06/19   CBC Auto Differential   Result Value Ref Range    WBC 7.8 4.0 - 10.5 K/CU MM    RBC 4.74 4.2 - 5.4 M/CU MM    Hemoglobin 13.6 12.5 - 16.0 GM/DL    Hematocrit 41.9 37 - 47 %    MCV 88.4 78 - 100 FL    MCH 28.7 27 - 31 PG    MCHC 32.5 32.0 - 36.0 %    RDW 12.4 11.7 - 14.9 %    Platelets 934 466 - 762 K/CU MM    MPV 10.5 7.5 - 11.1 FL    Differential Type AUTOMATED DIFFERENTIAL     Segs Relative 87.5 (H) 36 - 66 %    Lymphocytes % 6.3 (L) 24 - 44 %    Monocytes % 5.1 (H) 0 - 4 %    Eosinophils % 0.6 0 - 3 %    Basophils % 0.4 0 - 1 %    Segs Absolute 6.8 K/CU MM    Lymphocytes # 0.5 K/CU MM    Monocytes # 0.4 K/CU MM    Eosinophils # 0.1 K/CU MM    Basophils # 0.0 K/CU MM    Immature Neutrophil % 0.1 0 - 0.43 %    Total Immature Neutrophil 0.01 K/CU MM   Comprehensive Metabolic Panel

## 2019-06-08 PROCEDURE — 93010 ELECTROCARDIOGRAM REPORT: CPT | Performed by: INTERNAL MEDICINE

## 2019-06-11 LAB
EKG ATRIAL RATE: 74 BPM
EKG DIAGNOSIS: NORMAL
EKG P AXIS: 35 DEGREES
EKG P-R INTERVAL: 196 MS
EKG Q-T INTERVAL: 394 MS
EKG QRS DURATION: 76 MS
EKG QTC CALCULATION (BAZETT): 437 MS
EKG R AXIS: -9 DEGREES
EKG T AXIS: 40 DEGREES
EKG VENTRICULAR RATE: 74 BPM

## 2019-12-02 ENCOUNTER — OFFICE VISIT (OUTPATIENT)
Dept: CARDIOLOGY CLINIC | Age: 74
End: 2019-12-02
Payer: COMMERCIAL

## 2019-12-02 VITALS
BODY MASS INDEX: 24.27 KG/M2 | WEIGHT: 137 LBS | DIASTOLIC BLOOD PRESSURE: 80 MMHG | SYSTOLIC BLOOD PRESSURE: 120 MMHG | HEIGHT: 63 IN | HEART RATE: 80 BPM

## 2019-12-02 DIAGNOSIS — Z98.61 HISTORY OF PTCA 1: Primary | ICD-10-CM

## 2019-12-02 PROCEDURE — 99214 OFFICE O/P EST MOD 30 MIN: CPT | Performed by: INTERNAL MEDICINE

## 2020-12-01 ENCOUNTER — OFFICE VISIT (OUTPATIENT)
Dept: CARDIOLOGY CLINIC | Age: 75
End: 2020-12-01
Payer: COMMERCIAL

## 2020-12-01 VITALS
DIASTOLIC BLOOD PRESSURE: 90 MMHG | HEART RATE: 64 BPM | WEIGHT: 140.6 LBS | SYSTOLIC BLOOD PRESSURE: 162 MMHG | HEIGHT: 63 IN | BODY MASS INDEX: 24.91 KG/M2

## 2020-12-01 PROCEDURE — 93000 ELECTROCARDIOGRAM COMPLETE: CPT | Performed by: INTERNAL MEDICINE

## 2020-12-01 PROCEDURE — 99214 OFFICE O/P EST MOD 30 MIN: CPT | Performed by: INTERNAL MEDICINE

## 2020-12-01 NOTE — LETTER
Jericho Sanchez  1945  [id number]    Have you had any Chest Pain that is not new? - Yes  If Yes DO EKG - How does it feel - Dull Ache   How long does the pain last - 4 minutes    How long have you been having the pain - Day's   Did you take a Nitro   And did it relieve the pain - Yes    ? DO EKG IF: Patient has a Heart Rate above 100 or below 40     CAD (Coronary Artery Disease) patient should have one on file every 6 months     Have you had any Shortness of Breath - Yes  If Yes - When at rest    Have you had any dizziness - Yes, ongoing, occasional, unchanged  If Yes DO ORTHOSTATIC BP - when do you feel dizzy occasional while standing   How long does it last .1  minutes     ? Sitting wait 5 minutes do supine (laying down) wait 5 minutes then do standing - log each in \"vitals\" area in Epic  ? Be sure to ask what symptoms they are having if they get dizzy while completing ortho stats such as room spinning, nausea, etc.    Have you had any palpitations that are not new? - Yes, ongoing, occasional, unchanged  If Yes DO EKG - Do you feel your heart fluttering  How long does it last - .2  minutes     Is the patient on any of the following medications - NONE  If Yes DO EKG - Needs done every 6 months    Do you have any edema - swelling in NONE    Do you have a surgery or procedure scheduled in the near future - No  ? If Yes- DO EKG  ?   ? Ask patient if they want to sign up for MyChart if they are not already signed up    ? Check to see if we have an E-MAIL on file for the patient    ? Check medication list thoroughly!!! AND RECONCILE OUTSIDE MEDICATIONS  If dose has changed change the entire order not just the MG  BE SURE TO ASK PATIENT IF THEY NEED MEDICATION REFILLS    ?  At check out add to every patient's \"wrap up\" the following dot phrase AFTERHOURSEDUCATION and ensure we explain this to our patients

## 2020-12-01 NOTE — PATIENT INSTRUCTIONS
Please be informed that if you contact our office outside of normal business hours the physician on call cannot help with any scheduling or rescheduling issues, procedure instruction questions or any type of medication issue. We advise you for any urgent/emergency that you go to the nearest emergency room! PLEASE CALL OUR OFFICE DURING NORMAL BUSINESS HOURS    Monday - Friday   8 am to 5 pm    [place] [address]: [phone]    [place]:  [phone]      Please hold on to these instructions the  will call you within 1-9 business days when we receive authorization from your insurance. Nuclear Stress Test    WHAT TO EXPECT:   ? You will need to confirm the test or it could be cancelled. ? This test will take approximately 2 hours: 1 hour in the AM &    1 hour in the PM. You will be given a time by the Technologist after the first part is completed to come back. ? You will be given a medication, through an IV in the hand, this will safely simulate exercise. This IV is also needed to inject the radioactive isotope unless you are able toe walk the treadmill. ? You will receive an injection in the AM & PM before the pictures. ? Using a special camera, you will have one set of pictures of your heart taken in the AM and a set of pictures in the PM.     PREPARATION FOR TEST:  ? Eat a light breakfast such as water or juice and toast.  ? If you are DIABETIC: Eat a normal breakfast with NO CAFFEINE and take your insulin as normal.   ? AVOID ALL FOODS & DRINKS containing CAFFEINE 12 HOURS PRIOR TO THE TEST: Including coffee, Tea, Ana and other soft drinks even those labeled  caffeine free or decaffeinated.  ? HOLD THESE MEDICATIONS Persantine & Theophylline (Theodur)  24 hours prior & bring your inhaler with you.    The physician will specify if the following Beta blockers need to be held for 24 hours prior to test: Toprol XL (metoprolol ER)

## 2020-12-01 NOTE — PROGRESS NOTES
CARDIOLOGY NOTE      12/1/2020    RE: Penelope Wallace  (1945)                               TO:  Dr. Abdullahi Bravo MD            CHIEF COMPLAINT   Aris is a 66 y.o. female who was seen today for management of  cad                                    HPI:                   The patient has cardiac complaints of mild mid sternal recurrent CP with moderate  took NTG with relief. For a few weeks, had lad pci in past  Patient also seen  for    - Coronary artery disease, has chest pain. Patient is  compliant with prescribed medicines. - Hypertension,is  well controlled, pt is  compliant with medicines  - Hyperlipidimea, importance of hyperlipidimea discussed with pt. Penelope Wallace has the following history recorded in care path:  Patient Active Problem List    Diagnosis Date Noted    HTN (hypertension)      Priority: Low    Chest pain 06/07/2013     Priority: Low    CAD (coronary artery disease)      Priority: Low    History of PTCA 1      Priority: Low    Hyperlipidemia      Priority: Low    H/O cardiac catheterization      Priority: Low     Current Outpatient Medications   Medication Sig Dispense Refill    ondansetron (ZOFRAN ODT) 4 MG disintegrating tablet Take 1 tablet by mouth every 8 hours as needed for Nausea 15 tablet 0    isosorbide mononitrate (IMDUR) 60 MG extended release tablet Take 1.5 tablets by mouth daily 45 tablet 11    nitroGLYCERIN (NITROSTAT) 0.4 MG SL tablet Place 1 tablet under the tongue every 5 minutes as needed for Chest pain 25 tablet 2    metoprolol (LOPRESSOR) 50 MG tablet Take 1 tablet by mouth 2 times daily 90 tablet 2    alendronate (FOSAMAX) 70 MG tablet   Take 70 mg by mouth every 7 days       Omega-3 Fatty Acids (FISH OIL) 1000 MG CAPS Take 1,000 mg by mouth 2 times daily      Multiple Vitamins-Minerals (MULTIVITAMIN PO) Take 1 tablet by mouth daily      omeprazole (PRILOSEC) 40 MG capsule Take 40 mg by mouth daily.       calcium carbonate 600  Smokeless tobacco: Never Used   Substance Use Topics    Alcohol use: Yes     Alcohol/week: 0.0 standard drinks     Comment: rarely     CAFFEINE-[employer]oca [employer]a daily      Review of Systems:    Constitutional: Negative for diaphoresis and fatigue  Psychological:Negative for anxiety or depression  HENT: Negative for headaches, nasal congestion, sinus pain or vertigo  Eyes: Negative for visual disturbance. Endocrine: Negative for polydipsia/polyuria  Respiratory: Negative for shortness of breath  Cardiovascular:  CP  Gastrointestinal: Negative for abdominal pain or heartburn  Genito-Urinary: Negative for urinary frequency/urgency  Musculoskeletal: Negative for muscle pain, muscular weakness, negative for pain in arm and leg or swelling in foot and leg  Neurological: Negative for dizziness, headaches, memory loss, numbness/tingling, visual changes, syncope  Dermatological: Negative for rash    Objective:    Vitals:    12/01/20 0928 12/01/20 0935   BP: (!) 160/98 (!) 162/90   Site: Left Upper Arm Left Upper Arm   Position: Sitting Sitting   Cuff Size: Medium Adult Medium Adult   Pulse: 64    Weight: 140 lb 9.6 oz (63.8 kg)    Height: 5' 3\" (1.6 m)      BP (!) 162/90 (Site: Left Upper Arm, Position: Sitting, Cuff Size: Medium Adult)   Pulse 64   Ht 5' 3\" (1.6 m)   Wt 140 lb 9.6 oz (63.8 kg)   LMP 01/01/1998 (Approximate)   BMI 24.91 kg/m²     No flowsheet data found. Wt Readings from Last 3 Encounters:   12/01/20 140 lb 9.6 oz (63.8 kg)   12/02/19 137 lb (62.1 kg)   06/06/19 140 lb (63.5 kg)     Body mass index is 24.91 kg/m². GENERAL - Alert, oriented, pleasant, in no apparent distress. EYES: No jaundice, no conjunctival pallor. SKIN: It is warm & dry. No rashes. No Echhymosis    HEENT - No clinically significant abnormalities seen. Neck - Supple. No jugular venous distention noted. No carotid bruits. Cardiovascular - Normal S1 and S2 without obvious murmur or gallop.     Extremities - No cyanosis, clubbing, or significant edema. Pulmonary - No respiratory distress. No wheezes or rales. Abdomen - No masses, tenderness, or organomegaly. Musculoskeletal - No significant edema. No joint deformities. No muscle wasting. Neurologic - Cranial nerves II through XII are grossly intact. There were no gross focal neurologic abnormalities. Lab Review   No results found for: CKTOTAL, CKMB, CKMBINDEX, TROPONINT  BNP:  No results found for: BNP  PT/INR:  No results found for: INR  No results found for: LABA1C  Lab Results   Component Value Date    WBC 7.8 06/06/2019    HCT 41.9 06/06/2019    MCV 88.4 06/06/2019     06/06/2019     Lab Results   Component Value Date    CHOL 1465 03/18/2019    TRIG 167 03/18/2019    HDL 38 03/18/2019    LDLCALC 94 03/18/2019    LDLDIRECT 73 09/25/2012     Lab Results   Component Value Date    ALT 13 06/06/2019    AST 20 06/06/2019     BMP:    Lab Results   Component Value Date     06/06/2019    K 4.2 06/06/2019     06/06/2019    CO2 31 06/06/2019    BUN 20 06/06/2019    CREATININE 0.7 06/06/2019     CMP:   Lab Results   Component Value Date     06/06/2019    K 4.2 06/06/2019     06/06/2019    CO2 31 06/06/2019    BUN 20 06/06/2019    PROT 7.4 06/06/2019    PROT 6.6 03/21/2018     TSH:  No results found for: TSH, TSHHS        Impression:    1. History of PTCA 1       Patient Active Problem List   Diagnosis Code    CAD (coronary artery disease) I25.10    History of PTCA 1 Z98.61    Hyperlipidemia E78.5    H/O cardiac catheterization Z98.890    Chest pain R07.9    HTN (hypertension) I10       Assessment & Plan:         - old [facility] dw pt      -     CORONARY ARTERY DISEASE:  symptomatic     All available  tests in chart reviewed. Management discussed . Testing ordered  yes, stress cardiolite                                 -  Hypertension: Patients blood pressure is normal. Patient is advised about low sodium diet.  Present medical regimen will not be changed. -  LIPID MANAGEMENT:  Importance of lipid levels discussed with patient   and patient was given dietary advice. NCEP- ATP III guidelines reviewed with patient. -   Changes  in medicines made:  No                                        Porfirio aDvenport MD    [facility] [place]

## 2020-12-30 ENCOUNTER — PROCEDURE VISIT (OUTPATIENT)
Dept: CARDIOLOGY CLINIC | Age: 75
End: 2020-12-30
Payer: COMMERCIAL

## 2020-12-30 LAB
LV EF: 60 %
LVEF MODALITY: NORMAL

## 2020-12-30 PROCEDURE — 93016 CV STRESS TEST SUPVJ ONLY: CPT | Performed by: INTERNAL MEDICINE

## 2020-12-30 PROCEDURE — A9500 TC99M SESTAMIBI: HCPCS | Performed by: INTERNAL MEDICINE

## 2020-12-30 PROCEDURE — 93017 CV STRESS TEST TRACING ONLY: CPT | Performed by: INTERNAL MEDICINE

## 2020-12-30 PROCEDURE — 78452 HT MUSCLE IMAGE SPECT MULT: CPT | Performed by: INTERNAL MEDICINE

## 2020-12-30 PROCEDURE — 93018 CV STRESS TEST I&R ONLY: CPT | Performed by: INTERNAL MEDICINE

## 2020-12-30 NOTE — PROGRESS NOTES
10:44 AM    12/30/20    Site: antecubital right, condition patent and no redness    # of attempts: 1

## 2021-01-04 ENCOUNTER — TELEPHONE (OUTPATIENT)
Dept: CARDIOLOGY CLINIC | Age: 76
End: 2021-01-04

## 2021-01-04 NOTE — TELEPHONE ENCOUNTER
Unable to reach patient, left voicemail of results. Conclusions        Summary    Normal tracer uptake in all segments of myocardium on stress ans rest    images. Normal Stress nuclear scintigraphic study suggestive of normal    myocardial perfusion. Gated images demonstrate normal left ventricular    systolic function with EF of 60 %.         Recommendation    Continue present medical therapy and followup in office as scheduled.

## 2021-02-18 ENCOUNTER — TELEPHONE (OUTPATIENT)
Dept: CARDIOLOGY CLINIC | Age: 76
End: 2021-02-18

## 2021-02-18 NOTE — TELEPHONE ENCOUNTER
LM to let patient know we need to reschedule her appointment 3/5/2021 to a different day, told pt to call us to make new appointment.

## 2021-02-26 ENCOUNTER — OFFICE VISIT (OUTPATIENT)
Dept: CARDIOLOGY CLINIC | Age: 76
End: 2021-02-26
Payer: COMMERCIAL

## 2021-02-26 VITALS
DIASTOLIC BLOOD PRESSURE: 70 MMHG | HEART RATE: 68 BPM | BODY MASS INDEX: 24.63 KG/M2 | WEIGHT: 139 LBS | HEIGHT: 63 IN | SYSTOLIC BLOOD PRESSURE: 118 MMHG

## 2021-02-26 DIAGNOSIS — Z98.61 HISTORY OF PTCA 1: Primary | ICD-10-CM

## 2021-02-26 PROCEDURE — 99213 OFFICE O/P EST LOW 20 MIN: CPT | Performed by: INTERNAL MEDICINE

## 2021-02-26 NOTE — LETTER
Jose Galdamez  1945  [id number]    Have you had any Chest Pain that is not new? - No        Have you had any Shortness of Breath - No     Have you had any dizziness - No      Have you had any palpitations that are not new? - No      If Yes DO EKG - Needs done every 6 months    Do you have any edema - swelling in No      Vein \"LEG PROBLEM Questionnaire\"  1. Do you have prominent leg veins? No   2. Do you have any skin discoloration? No  3. Do you have any healed or active sores? No  4. Do you have swelling of the legs? No  5. Do you have a family history of varicose veins? No  6. Does your profession involve pro-longed        standing or heavy lifting? No  7. Have you been fighting overweight problems? No  8. Do you have restless legs? Yes  9. Do you have any night time cramps? Yes  10.  Do you have any of the following in your legs:      No      Do you have a surgery or procedure scheduled in the near future - No

## 2021-02-26 NOTE — PROGRESS NOTES
Finn Handy  is a  Established patient  ,66 y.o.   female here for evaluation of the following chief complaint(s):    cad        SUBJECTIVE/OBJECTIVE:  HPI : h/o  Cad, htn, hyperlipidimea now here  Has no cardiac complains    Review of Systems    neg    Vitals:    02/26/21 0854   BP: 118/70   Pulse: 68   Weight: 139 lb (63 kg)   Height: 5' 3\" (1.6 m)     /70   Pulse 68   Ht 5' 3\" (1.6 m)   Wt 139 lb (63 kg)   LMP 01/01/1998 (Approximate)   BMI 24.62 kg/m²   No flowsheet data found. Wt Readings from Last 3 Encounters:   02/26/21 139 lb (63 kg)   12/01/20 140 lb 9.6 oz (63.8 kg)   12/02/19 137 lb (62.1 kg)     Body mass index is 24.62 kg/m². Physical Exam     Neck: JVD      Lungs : clear    Cardio : Si and S2 audilble      Ext: edema      All pertinent data reviewed      Meds : reviewed         Tests ordered        ASSESSMENT/PLAN:               -     CORONARY ARTERY DISEASE:  asymptomatic     All available  tests in chart reviewed. Management discussed . Testing ordered  no                                 -  Hypertension: Patients blood pressure is normal. Patient is advised about low sodium diet. Present medical regimen will not be changed. -  LIPID MANAGEMENT:  Importance of lipid levels discussed with patient   and patient was given dietary advice. NCEP- ATP III guidelines reviewed with patient. -   Changes  in medicines made: No                                    An electronic signature was used to authenticate this note.     --Jhonny Pickett MD

## 2021-08-20 ENCOUNTER — OFFICE VISIT (OUTPATIENT)
Dept: CARDIOLOGY CLINIC | Age: 76
End: 2021-08-20
Payer: COMMERCIAL

## 2021-08-20 VITALS
DIASTOLIC BLOOD PRESSURE: 80 MMHG | BODY MASS INDEX: 22.29 KG/M2 | SYSTOLIC BLOOD PRESSURE: 140 MMHG | HEART RATE: 56 BPM | HEIGHT: 63 IN | OXYGEN SATURATION: 99 % | WEIGHT: 125.8 LBS

## 2021-08-20 DIAGNOSIS — Z98.61 HISTORY OF PTCA 1: Primary | ICD-10-CM

## 2021-08-20 PROCEDURE — 93000 ELECTROCARDIOGRAM COMPLETE: CPT | Performed by: INTERNAL MEDICINE

## 2021-08-20 PROCEDURE — 99214 OFFICE O/P EST MOD 30 MIN: CPT | Performed by: INTERNAL MEDICINE

## 2021-08-20 RX ORDER — LISINOPRIL 5 MG/1
2.5 TABLET ORAL DAILY
COMMUNITY

## 2021-08-20 NOTE — PROGRESS NOTES
Michael Espino  is a  Established patient  ,66 y.o.   female here for evaluation of the following chief complaint(s):    cad        SUBJECTIVE/OBJECTIVE:  HPI : h/o  Cad, htn, hyperlipidimea now here  Has no cardiac complains  But has occasional SOB    Review of Systems     SOB    Vitals:    08/20/21 0926 08/20/21 0935   BP: (!) 150/88 (!) 140/80   Site: Left Upper Arm Left Upper Arm   Position: Sitting Sitting   Cuff Size: Medium Adult Medium Adult   Pulse: 64 56   SpO2: 99%    Weight: 125 lb 12.8 oz (57.1 kg)    Height: 5' 2.5\" (1.588 m)      BP (!) 140/80 (Site: Left Upper Arm, Position: Sitting, Cuff Size: Medium Adult)   Pulse 56   Ht 5' 2.5\" (1.588 m)   Wt 125 lb 12.8 oz (57.1 kg)   LMP 01/01/1998 (Approximate)   SpO2 99%   BMI 22.64 kg/m²   No flowsheet data found. Wt Readings from Last 3 Encounters:   08/20/21 125 lb 12.8 oz (57.1 kg)   02/26/21 139 lb (63 kg)   12/01/20 140 lb 9.6 oz (63.8 kg)     Body mass index is 22.64 kg/m². Physical Exam     Neck: JVD      Lungs : clear    Cardio : Si and S2 audilble      Ext: edema      All pertinent data reviewed      Meds : reviewed         Tests ordered    ETT    ASSESSMENT/PLAN:               -     CORONARY ARTERY DISEASE:  symptomatic     All available  tests in chart reviewed. Management discussed . Testing ordered  ETT                             On metoptolol    -  Hypertension: Patients blood pressure is normal. Patient is advised about low sodium diet. Present medical regimen will not be changed. on imdur                  -  LIPID MANAGEMENT:  Importance of lipid levels discussed with patient   and patient was given dietary advice. NCEP- ATP III guidelines reviewed with patient. -   Changes  in medicines made: No     On pravachol                               An electronic signature was used to authenticate this note.     --Meagan Mcclain MD

## 2021-08-20 NOTE — LETTER
Sabrina Fontenot Quiet  1945  [id number]    Have you had any Chest Pain that is not new? - No     Have you had any Shortness of Breath - Yes  On exertion     Have you had any dizziness - No    Have you had any palpitations that are not new?  - No     Do you have any edema - swelling in No      Do you have a surgery or procedure scheduled in the near future - No

## 2021-08-20 NOTE — PATIENT INSTRUCTIONS
**It is YOUR responsibilty to bring medication bottles and/or updated medication list to [address] [place] [address]. This will allow us to better serve you and all your healthcare needs**    Please be informed that if you contact our office outside of normal business hours the physician on call cannot help with any scheduling or rescheduling issues, procedure instruction questions or any type of medication issue. We advise you for any urgent/emergency that you go to the nearest emergency room!     PLEASE CALL OUR OFFICE DURING NORMAL BUSINESS HOURS    Monday - Friday   8 am to 5 pm    [place]: [phone]    [place]:  [phone]

## 2021-09-01 ENCOUNTER — PROCEDURE VISIT (OUTPATIENT)
Dept: CARDIOLOGY CLINIC | Age: 76
End: 2021-09-01
Payer: COMMERCIAL

## 2021-09-01 DIAGNOSIS — R06.02 SOB (SHORTNESS OF BREATH): ICD-10-CM

## 2021-09-01 DIAGNOSIS — Z98.61 HISTORY OF PTCA 1: ICD-10-CM

## 2021-09-01 PROCEDURE — 93015 CV STRESS TEST SUPVJ I&R: CPT | Performed by: INTERNAL MEDICINE

## 2021-10-17 ENCOUNTER — APPOINTMENT (OUTPATIENT)
Dept: GENERAL RADIOLOGY | Age: 76
End: 2021-10-17
Payer: COMMERCIAL

## 2021-10-17 ENCOUNTER — APPOINTMENT (OUTPATIENT)
Dept: CT IMAGING | Age: 76
End: 2021-10-17
Payer: COMMERCIAL

## 2021-10-17 ENCOUNTER — HOSPITAL ENCOUNTER (EMERGENCY)
Age: 76
Discharge: ANOTHER HOSPITAL | End: 2021-10-17
Attending: EMERGENCY MEDICINE
Payer: COMMERCIAL

## 2021-10-17 VITALS
HEIGHT: 64 IN | BODY MASS INDEX: 20.32 KG/M2 | SYSTOLIC BLOOD PRESSURE: 197 MMHG | TEMPERATURE: 97.6 F | WEIGHT: 119 LBS | DIASTOLIC BLOOD PRESSURE: 77 MMHG | OXYGEN SATURATION: 92 % | HEART RATE: 70 BPM | RESPIRATION RATE: 18 BRPM

## 2021-10-17 DIAGNOSIS — I63.9 ACUTE CVA (CEREBROVASCULAR ACCIDENT) (HCC): Primary | ICD-10-CM

## 2021-10-17 LAB
ALBUMIN SERPL-MCNC: 4.5 GM/DL (ref 3.4–5)
ALP BLD-CCNC: 62 IU/L (ref 40–129)
ALT SERPL-CCNC: 8 U/L (ref 10–40)
ANION GAP SERPL CALCULATED.3IONS-SCNC: 12 MMOL/L (ref 4–16)
APTT: 32.9 SECONDS (ref 25.1–37.1)
AST SERPL-CCNC: 15 IU/L (ref 15–37)
BASOPHILS ABSOLUTE: 0.1 K/CU MM
BASOPHILS RELATIVE PERCENT: 1 % (ref 0–1)
BILIRUB SERPL-MCNC: 0.4 MG/DL (ref 0–1)
BUN BLDV-MCNC: 16 MG/DL (ref 6–23)
CALCIUM SERPL-MCNC: 9.5 MG/DL (ref 8.3–10.6)
CHLORIDE BLD-SCNC: 97 MMOL/L (ref 99–110)
CO2: 28 MMOL/L (ref 21–32)
CREAT SERPL-MCNC: 0.8 MG/DL (ref 0.6–1.1)
DIFFERENTIAL TYPE: ABNORMAL
EOSINOPHILS ABSOLUTE: 0.6 K/CU MM
EOSINOPHILS RELATIVE PERCENT: 6 % (ref 0–3)
GFR AFRICAN AMERICAN: >60 ML/MIN/1.73M2
GFR NON-AFRICAN AMERICAN: >60 ML/MIN/1.73M2
GLUCOSE BLD-MCNC: 105 MG/DL (ref 70–99)
GLUCOSE BLD-MCNC: 108 MG/DL (ref 70–99)
HCT VFR BLD CALC: 37.9 % (ref 37–47)
HEMOGLOBIN: 12.3 GM/DL (ref 12.5–16)
IMMATURE NEUTROPHIL %: 0.3 % (ref 0–0.43)
INR BLD: 1.1 INDEX
LYMPHOCYTES ABSOLUTE: 2.4 K/CU MM
LYMPHOCYTES RELATIVE PERCENT: 25.5 % (ref 24–44)
MCH RBC QN AUTO: 27.2 PG (ref 27–31)
MCHC RBC AUTO-ENTMCNC: 32.5 % (ref 32–36)
MCV RBC AUTO: 83.7 FL (ref 78–100)
MONOCYTES ABSOLUTE: 0.7 K/CU MM
MONOCYTES RELATIVE PERCENT: 7 % (ref 0–4)
PDW BLD-RTO: 13.2 % (ref 11.7–14.9)
PLATELET # BLD: 199 K/CU MM (ref 140–440)
PMV BLD AUTO: 11 FL (ref 7.5–11.1)
POTASSIUM SERPL-SCNC: 4.1 MMOL/L (ref 3.5–5.1)
PROTHROMBIN TIME: 13.8 SECONDS (ref 11.7–14.5)
RBC # BLD: 4.53 M/CU MM (ref 4.2–5.4)
SARS-COV-2, NAAT: NOT DETECTED
SEGMENTED NEUTROPHILS ABSOLUTE COUNT: 5.6 K/CU MM
SEGMENTED NEUTROPHILS RELATIVE PERCENT: 60.2 % (ref 36–66)
SODIUM BLD-SCNC: 137 MMOL/L (ref 135–145)
SOURCE: NORMAL
TOTAL IMMATURE NEUTOROPHIL: 0.03 K/CU MM
TOTAL PROTEIN: 7.4 GM/DL (ref 6.4–8.2)
TROPONIN T: <0.01 NG/ML
WBC # BLD: 9.3 K/CU MM (ref 4–10.5)

## 2021-10-17 PROCEDURE — 70450 CT HEAD/BRAIN W/O DYE: CPT

## 2021-10-17 PROCEDURE — 6360000004 HC RX CONTRAST MEDICATION: Performed by: EMERGENCY MEDICINE

## 2021-10-17 PROCEDURE — 96376 TX/PRO/DX INJ SAME DRUG ADON: CPT

## 2021-10-17 PROCEDURE — 87635 SARS-COV-2 COVID-19 AMP PRB: CPT

## 2021-10-17 PROCEDURE — 96374 THER/PROPH/DIAG INJ IV PUSH: CPT

## 2021-10-17 PROCEDURE — 6360000002 HC RX W HCPCS: Performed by: EMERGENCY MEDICINE

## 2021-10-17 PROCEDURE — 2500000003 HC RX 250 WO HCPCS: Performed by: EMERGENCY MEDICINE

## 2021-10-17 PROCEDURE — 71045 X-RAY EXAM CHEST 1 VIEW: CPT

## 2021-10-17 PROCEDURE — 85610 PROTHROMBIN TIME: CPT

## 2021-10-17 PROCEDURE — 93005 ELECTROCARDIOGRAM TRACING: CPT | Performed by: EMERGENCY MEDICINE

## 2021-10-17 PROCEDURE — 70498 CT ANGIOGRAPHY NECK: CPT

## 2021-10-17 PROCEDURE — 99285 EMERGENCY DEPT VISIT HI MDM: CPT

## 2021-10-17 PROCEDURE — 84484 ASSAY OF TROPONIN QUANT: CPT

## 2021-10-17 PROCEDURE — 85025 COMPLETE CBC W/AUTO DIFF WBC: CPT

## 2021-10-17 PROCEDURE — 82962 GLUCOSE BLOOD TEST: CPT

## 2021-10-17 PROCEDURE — 96375 TX/PRO/DX INJ NEW DRUG ADDON: CPT

## 2021-10-17 PROCEDURE — 85730 THROMBOPLASTIN TIME PARTIAL: CPT

## 2021-10-17 PROCEDURE — 80053 COMPREHEN METABOLIC PANEL: CPT

## 2021-10-17 RX ORDER — LORAZEPAM 2 MG/ML
1 INJECTION INTRAMUSCULAR ONCE
Status: COMPLETED | OUTPATIENT
Start: 2021-10-17 | End: 2021-10-17

## 2021-10-17 RX ORDER — ONDANSETRON 2 MG/ML
4 INJECTION INTRAMUSCULAR; INTRAVENOUS EVERY 30 MIN PRN
Status: DISCONTINUED | OUTPATIENT
Start: 2021-10-17 | End: 2021-10-17 | Stop reason: HOSPADM

## 2021-10-17 RX ORDER — MORPHINE SULFATE 4 MG/ML
4 INJECTION, SOLUTION INTRAMUSCULAR; INTRAVENOUS EVERY 30 MIN PRN
Status: DISCONTINUED | OUTPATIENT
Start: 2021-10-17 | End: 2021-10-17 | Stop reason: HOSPADM

## 2021-10-17 RX ORDER — LABETALOL HYDROCHLORIDE 5 MG/ML
10 INJECTION, SOLUTION INTRAVENOUS ONCE
Status: DISCONTINUED | OUTPATIENT
Start: 2021-10-17 | End: 2021-10-17 | Stop reason: HOSPADM

## 2021-10-17 RX ORDER — LABETALOL HYDROCHLORIDE 5 MG/ML
10 INJECTION, SOLUTION INTRAVENOUS ONCE
Status: COMPLETED | OUTPATIENT
Start: 2021-10-17 | End: 2021-10-17

## 2021-10-17 RX ADMIN — ONDANSETRON 4 MG: 2 INJECTION INTRAMUSCULAR; INTRAVENOUS at 12:11

## 2021-10-17 RX ADMIN — LABETALOL HYDROCHLORIDE 10 MG: 5 INJECTION, SOLUTION INTRAVENOUS at 10:47

## 2021-10-17 RX ADMIN — FAMOTIDINE 20 MG: 10 INJECTION INTRAVENOUS at 12:11

## 2021-10-17 RX ADMIN — ONDANSETRON 4 MG: 2 INJECTION INTRAMUSCULAR; INTRAVENOUS at 12:52

## 2021-10-17 RX ADMIN — IOPAMIDOL 75 ML: 755 INJECTION, SOLUTION INTRAVENOUS at 10:27

## 2021-10-17 RX ADMIN — LORAZEPAM 1 MG: 2 INJECTION INTRAMUSCULAR; INTRAVENOUS at 12:52

## 2021-10-17 RX ADMIN — LABETALOL HYDROCHLORIDE 10 MG: 5 INJECTION, SOLUTION INTRAVENOUS at 10:20

## 2021-10-17 RX ADMIN — MORPHINE SULFATE 4 MG: 4 INJECTION INTRAVENOUS at 11:00

## 2021-10-17 RX ADMIN — ONDANSETRON 4 MG: 2 INJECTION INTRAMUSCULAR; INTRAVENOUS at 11:00

## 2021-10-17 ASSESSMENT — PAIN SCALES - GENERAL: PAINLEVEL_OUTOF10: 8

## 2021-10-17 NOTE — ED NOTES
PPt arrives via ems with express aphasia and right side weakness. Pt went to [facility] at 0900 this morning and called daughter. Dr Rhonda Rosenberg at bedside. EKG and blood glucose complete. Stroke alert activated.  Pt to [address] RN  10/17/21 1038

## 2021-10-17 NOTE — ED PROVIDER NOTES
Triage Chief Complaint:   Altered Mental Status (pt walked to a neighbors house this morning at 0900 pt's daughter saw her yesterday and pt was acting herself then )    [place]:  Harmony Perez is a 66 y.o. female that presents by [facility] with complaints of expressive aphasia. Patient was also described by the squad is having drift in her right upper extremity as well as facial asymmetry. . Patient's last known well is reported as 9 AM, per patient and medics on the squad. The patient reported that she had walked from her home to a neighbor's home 2 doors down and at that time was having expressive aphasia. She seemed to improve but then was having difficulty again. She has not taken her normal blood pressure medicines this morning. Relative to this it was determined that her last known well was sometime yesterday afternoon when her daughter spoke with her. Upon arrival to the emergency department the patient is having expressive aphasia and also receptive aphasia with difficulty answering questions. She is able to follow commands. She was sent to CT and report at [phone] showed no acute hemorrhagic abnormality seen on the CT scan of her head. CTA is pending at the time of this note. ROS:  At least 10 systems reviewed and otherwise acutely negative except as in the [address]     Past Medical History:   Diagnosis Date    Arthritis     Blood transfusion     CAD (coronary artery disease)     H/O cardiac catheterization     3-3-2010 normal  stent present in LAD patent-rest of vessels w/out disease     H/O cardiovascular stress test 12/30/2020    EF 60% normal stress test    History of nuclear stress test 08/30/2016    cardiolite-normal,EF69%    History of PTCA 1     3- PTCA w/ Taxus stent to LAD; PTCA of diagonal vessel    HTN (hypertension)     Hx of cardiac arrhythmia     hx given by patient ? what    Hx of cardiovascular stress test 06/04/2015    lexiscan-normal,EF70%    Hx of cardiovascular stress test 2018    EF 70%  Normal study.  Hx of echocardiogram     3-2010 EF>55%;     Hyperlipidemia     IBS (irritable bowel syndrome)     with constipation     Past Surgical History:   Procedure Laterality Date    ABDOMEN SURGERY      Exploratory Lap, cyst ruptured.  ABDOMINAL EXPLORATION SURGERY      ovarian cyst removed    BREAST ENHANCEMENT SURGERY      b/l    BREAST LUMPECTOMY      left     COLONOSCOPY  2012    Normal exam    COLONOSCOPY  2019    polyps, grade 1 int hem, bx of t1, call in one week    COLONOSCOPY N/A 2019    COLONOSCOPY POLYPECTOMY SNARE/COLD BIOPSY performed by Jassi Lackey MD at [place] [address]       Family History   Problem Relation Age of Onset    Heart Disease Mother     Other Father         Alzheimer's    Breast Cancer Sister     Stroke Daughter     Seizures Daughter      Social History     Socioeconomic History    Marital status:      Spouse name: Not on file    Number of children: 3    Years of education: Not on file    Highest education level: Not on file   Occupational History    Occupation: Nurse-retired    Tobacco Use    Smoking status: Former Smoker     Quit date: 1984     Years since quittin.8    Smokeless tobacco: Never Used   Vaping Use    Vaping Use: Never used   Substance and Sexual Activity    Alcohol use: Yes     Alcohol/week: 0.0 standard drinks     Comment: Rarely. Caffeine: decaf coke and tea     Drug use: No    Sexual activity: Not on file     Comment:    Other Topics Concern    Not on file   Social History Narrative    Not on file     Social Determinants of Health     Financial Resource Strain:     Difficulty of Paying Living Expenses:    Food Insecurity:     Worried About Running Out of Food in the Last Year:     [address] in the Last Year:    Transportation Needs:     Lack of Transportation (Medical):      Lack of Transportation (Non-Medical): Physical Activity:     Days of Exercise per Week:     Minutes of Exercise per Session:    Stress:     Feeling of Stress :    Social Connections:     Frequency of Communication with Friends and Family:     Frequency of Social Gatherings with Friends and Family:     Attends Alevism Services:     Active Member of Clubs or Organizations:     Attends Club or Organization Meetings:     Marital Status:    Intimate Partner Violence:     Fear of Current or Ex-Partner:     Emotionally Abused:     Physically Abused:     Sexually Abused:      Current Facility-Administered Medications   Medication Dose Route Frequency Provider Last Rate Last Admin    morphine sulfate (PF) injection 4 mg  4 mg IntraVENous Q30 Min PRN Sven Damme, DO   4 mg at 10/17/21 1100    ondansetron (ZOFRAN) injection 4 mg  4 mg IntraVENous Q30 Min PRN [place] Damme, DO   4 mg at 10/17/21 1100    labetalol (NORMODYNE;TRANDATE) injection 10 mg  10 mg IntraVENous Once Riaz Rodrigues, DO        famotidine (PEPCID) injection 20 mg  20 mg IntraVENous Once Riaz Rodrigues, DO        ondansetron [facility]) injection 4 mg  4 mg IntraVENous Q30 Min PRN [place] Damme, DO         Current Outpatient Medications   Medication Sig Dispense Refill    L-Lysine 1000 MG TABS by NOT APPLICABLE route      lisinopril (PRINIVIL;ZESTRIL) 5 MG tablet Take 5 mg by mouth daily      ondansetron (ZOFRAN ODT) 4 MG disintegrating tablet Take 1 tablet by mouth every 8 hours as needed for Nausea (Patient not taking: Reported on 8/20/2021) 15 tablet 0    isosorbide mononitrate (IMDUR) 60 MG extended release tablet Take 1.5 tablets by mouth daily 45 tablet 11    nitroGLYCERIN (NITROSTAT) 0.4 MG SL tablet Place 1 tablet under the tongue every 5 minutes as needed for Chest pain 25 tablet 2    metoprolol (LOPRESSOR) 50 MG tablet Take 1 tablet by mouth 2 times daily 90 tablet 2    alendronate (FOSAMAX) 70 MG tablet   Take 70 mg by mouth every 7 days       Omega-3 Fatty Acids (FISH OIL) 1000 MG CAPS Take 1,000 mg by mouth 2 times daily      Multiple Vitamins-Minerals (MULTIVITAMIN PO) Take 1 tablet by mouth daily      omeprazole (PRILOSEC) 40 MG capsule Take 40 mg by mouth daily.  calcium carbonate 600 MG TABS tablet Take 1 tablet by mouth 2 times daily.  pravastatin (PRAVACHOL) 40 MG tablet Take 1 tablet by mouth daily. 90 tablet 3    aspirin 81 MG tablet Take 81 mg by mouth daily. Allergies   Allergen Reactions    Naproxen Swelling       Nursing Notes Reviewed    Physical Exam:  ED Triage Vitals   Enc Vitals Group      BP       Pulse       Resp       Temp       Temp src       SpO2       Weight       Height       Head Circumference       Peak Flow       Pain Score       Pain Loc       Pain Edu? Excl. in [address]? GENERAL APPEARANCE: Awake and alert. Cooperative. Nontoxic in appearance  HEAD: Normocephalic. Atraumatic. EYES: EOM's grossly intact. Sclera anicteric. ENT: Mucous membranes are moist. Tolerates saliva. No trismus. NECK: Supple. No meningismus. Trachea midline. HEART: RRR. Radial pulses 2+. LUNGS: Respirations unlabored. CTAB  ABDOMEN: Soft. Non-tender. No guarding or rebound. Around 2:30 pm I reevaluated patient due to onset of epigastric discomfort. She was indicating pain but was unable to verbalize what she was feeling. She was tender in her epigastric area and grimaced with palpation. She was given pepcid and zofran prior to this evaluation. She was also given Morphine. It was not clear but she possibly has some gastritis for morphine administration. Ativan 1mg was ordered and she settled down. Her vital signs remained stable during this time   EXTREMITIES: No acute deformities. SKIN: Warm and dry.   NEUROLOGICAL:   Johnye Lundborg Raines's NIH Stroke Scale at 12:10 PM is:  Level of Consciousness:  0 - alert; keenly responsive    LOC Questions:  2 - answers neither question correctly    LOC Commands:  0 - performs both tasks correctly    Best Gaze:  0 - normal    Visual Fields:  0 - no visual loss    Facial Palsy:  1 - minor paralysis (flattened nasolabial fold, asymmetric on smiling)    Motor-Arm-Left:  0 - no drift, limb holds 90 (or 45) degrees for full 10 seconds    Motor-Leg-Left:  0 - no drift; leg holds 30 degree position for full 5 seconds    Motor-Arm-Right:  0 - no drift, limb holds 90 (or 45) degrees for full 10 seconds    Motor-Leg-Right:  0 - no drift; leg holds 30 degree position for full 5 seconds    Limb Ataxia:  0 - absent    Sensory:  0 - normal; no sensory loss    Best Language:  2 - severe aphasia; all communication is through fragmentary expression; great need for inference, questioning, and guessing by the listener. Range of information that can be exchanged is limited; listener carries burden of communication. Examiner cannot identify materials provided from patient response. Dysarthria:  1 - mild to moderate, patient slurs at least some words and at worst, can be understood with some difficulty    Extinction and Inattention:  0 - no abnormality  PSYCHIATRIC: Normal mood.     I have reviewed and interpreted all of the currently available lab results from this visit (if applicable):  Results for orders placed or performed during the hospital encounter of 10/17/21   CBC Auto Differential   Result Value Ref Range    WBC 9.3 4.0 - 10.5 K/CU MM    RBC 4.53 4.2 - 5.4 M/CU MM    Hemoglobin 12.3 (L) 12.5 - 16.0 GM/DL    Hematocrit 37.9 37 - 47 %    MCV 83.7 78 - 100 FL    MCH 27.2 27 - 31 PG    MCHC 32.5 32.0 - 36.0 %    RDW 13.2 11.7 - 14.9 %    Platelets 172 441 - 747 K/CU MM    MPV 11.0 7.5 - 11.1 FL    Differential Type AUTOMATED DIFFERENTIAL     Segs Relative 60.2 36 - 66 %    Lymphocytes % 25.5 24 - 44 %    Monocytes % 7.0 (H) 0 - 4 %    Eosinophils % 6.0 (H) 0 - 3 %    Basophils % 1.0 0 - 1 %    Segs Absolute 5.6 K/CU MM    Lymphocytes Absolute 2.4 K/CU MM    Monocytes Absolute 0.7 K/CU MM    Eosinophils Absolute 0.6 K/CU MM    Basophils Absolute 0.1 K/CU MM    Immature Neutrophil % 0.3 0 - 0.43 %    Total Immature Neutrophil 0.03 K/CU MM   Comprehensive Metabolic Panel   Result Value Ref Range    Sodium 137 135 - 145 MMOL/L    Potassium 4.1 3.5 - 5.1 MMOL/L    Chloride 97 (L) 99 - 110 mMol/L    CO2 28 21 - 32 MMOL/L    BUN 16 6 - 23 MG/DL    CREATININE 0.8 0.6 - 1.1 MG/DL    Glucose 108 (H) 70 - 99 MG/DL    Calcium 9.5 8.3 - 10.6 MG/DL    Albumin 4.5 3.4 - 5.0 GM/DL    Total Protein 7.4 6.4 - 8.2 GM/DL    Total Bilirubin 0.4 0.0 - 1.0 MG/DL    ALT 8 (L) 10 - 40 U/L    AST 15 15 - 37 IU/L    Alkaline Phosphatase 62 40 - 129 IU/L    GFR Non-African American >60 >60 mL/min/1.73m2    GFR African American >60 >60 mL/min/1.73m2    Anion Gap 12 4 - 16   Troponin   Result Value Ref Range    Troponin T <0.010 <0.01 NG/ML   Protime/INR & PTT   Result Value Ref Range    Protime 13.8 11.7 - 14.5 SECONDS    INR 1.10 INDEX    aPTT 32.9 25.1 - 37.1 SECONDS   POCT Glucose   Result Value Ref Range    POC Glucose 105 (H) 70 - 99 MG/DL   EKG 12 Lead   Result Value Ref Range    Ventricular Rate 68 BPM    Atrial Rate 68 BPM    P-R Interval 192 ms    QRS Duration 76 ms    Q-T Interval 402 ms    QTc Calculation (Bazett) 427 ms    P Axis 56 degrees    R Axis 7 degrees    T Axis 44 degrees    Diagnosis       Normal sinus rhythm  Normal ECG  When compared with ECG of 06-JUN-2019 23:28,  No significant change was found          Radiographs (if obtained):  [] The following radiograph was interpreted by myself in the absence of a radiologist:  [x] Radiologist's Report Reviewed:  CT Head WO Contrast    Result Date: 10/17/2021  No acute intracranial abnormality. Critical results were called by Dr. Himanshu Salas. Han Owen MD to [facility] on 10/17/2021 at 10:34. EKG (if obtained): (All EKG's are interpreted by myself in the absence of a cardiologist)  The Ekg interpreted by me in the absence of a cardiologist shows.   normal sinus rhythm with a rate of 68  Axis is   Normal  QTc is  427  Intervals and Durations are unremarkable. No specific ST-T wave changes appreciated. No evidence of acute ischemia. No significant change from prior EKG dated 20 August 2021    MDM:  1. Physician evaluation performed at:  [phone]  2. Stroke alert called at:  0943  3. CT results obtained at:  1034  4. Decision was made that TPA is NOT indicated in consultation with [facility] Stroke Neurologist, Dr. Angel April. t-PA NOT given due to the following EXCLUSION CRITERIA:  [x] Administration of t-PA can not be initiated within 4.5 hours of onset of symptoms  [] Evidence of intracranial hemorrhage on pretreatment CT  [] Clinical presentation suggestive of subarachnoid hemorrhage (even with normal CT)  [] CT shows multilobar infarction (hypodensity of > 1/3 cerebral hemisphere)  [] Known intracranial neoplasm, arteriovenous malformation or aneurysm  [] Significant head trauma (with sustained loss of consciousness), intracranial, or  intraspinal surgery within past 3 months  [x] *Blood pressure elevated (systolic > 833 mm Hg or diastolic > 705 mm Hg) Patient received labetalol 10mg IV twice for elevated BP. BP was reduced approximately 10%  [] *Abnormal blood glucose (< 50 or > 400mg/dL)  [] Active internal bleeding  [] Known bleeding risk (including but not limited to below)   Heparin, argatroban, or bivalirudin received within 48 hours with     PTT > upper limit of normal   Platelet count < 228,931/QR8   Current or recent use of anticoagulants including but not limited to: Warfarin (Coumadin®) within 5 days or INR > 1.7     Apixiban (Eliquis®) within 48 hours**     Dabigatran (Pradaxa®) within 72 hours**     Enoxaparin (Lovenox®) within 24 hours**     Fondaparinux (Arixtra®) within 72 hours**     Rivaroxaban (Xarelto®) within 24 hours**     **For patients with normal renal function.  Activity may be significantly   prolonged in the elderly or patients with renal her best interest medically. They were in agreement with this plan. Since patient was determined to have no major vessel obstruction on CTA, she was not deemed a candidate for clot retrieval so I made the clinical decision to transport her by ground to [facility]. First available ground unit was scheduled at 4pm. MICUs through [address] [place] [address] and [facility] were not available according to their dispatchers. I was called into patient's room by family to discuss the delay in transport around 2pm.  Family was understandably concerned about the delay in transport to the [place] [address] and made clear they felt she needed to get to [facility] as soon as possible. I attempted to explain that air transport was not necessary as she was not a candidate for interventional therapy based on data available at the time. Her family was insistent that she \"get out of [facility]" as soon as possible. [facility] did have a helicopter available and family was insistent that she be flown expediently to [facility]. [facility] helicopter transport was arranged and patient was flown to [facility] for [place] [address] evaluation and treatment per family request.      Clinical Impression:  1.  Acute CVA (cerebrovascular accident) [facility])      (Please note that portions of this note may have been completed with a voice recognition program. Efforts were made to edit the dictations but occasionally words are mis-transcribed.)    Melisa Briones, [address], DO  10/17/21 [address], DO  10/18/21 [address] [place] [address]

## 2021-10-18 LAB
EKG ATRIAL RATE: 68 BPM
EKG DIAGNOSIS: NORMAL
EKG P AXIS: 56 DEGREES
EKG P-R INTERVAL: 192 MS
EKG Q-T INTERVAL: 402 MS
EKG QRS DURATION: 76 MS
EKG QTC CALCULATION (BAZETT): 427 MS
EKG R AXIS: 7 DEGREES
EKG T AXIS: 44 DEGREES
EKG VENTRICULAR RATE: 68 BPM

## 2021-10-18 PROCEDURE — 93010 ELECTROCARDIOGRAM REPORT: CPT | Performed by: INTERNAL MEDICINE

## 2022-03-02 ENCOUNTER — OFFICE VISIT (OUTPATIENT)
Dept: CARDIOLOGY CLINIC | Age: 77
End: 2022-03-02
Payer: COMMERCIAL

## 2022-03-02 VITALS
HEART RATE: 55 BPM | BODY MASS INDEX: 22.96 KG/M2 | WEIGHT: 129.6 LBS | SYSTOLIC BLOOD PRESSURE: 138 MMHG | HEIGHT: 63 IN | DIASTOLIC BLOOD PRESSURE: 88 MMHG

## 2022-03-02 DIAGNOSIS — Z98.61 HISTORY OF PTCA 1: Primary | ICD-10-CM

## 2022-03-02 DIAGNOSIS — R06.09 DOE (DYSPNEA ON EXERTION): ICD-10-CM

## 2022-03-02 PROCEDURE — 93000 ELECTROCARDIOGRAM COMPLETE: CPT | Performed by: INTERNAL MEDICINE

## 2022-03-02 PROCEDURE — 99214 OFFICE O/P EST MOD 30 MIN: CPT | Performed by: INTERNAL MEDICINE

## 2022-03-02 NOTE — PATIENT INSTRUCTIONS
**It is YOUR responsibilty to bring medication bottles and/or updated medication list to [address] [place] [address]. This will allow us to better serve you and all your healthcare needs**        Please be informed that if you contact our office outside of normal business hours the physician on call cannot help with any scheduling or rescheduling issues, procedure instruction questions or any type of medication issue. We advise you for any urgent/emergency that you go to the nearest emergency room!     PLEASE CALL OUR OFFICE DURING NORMAL BUSINESS HOURS    Monday - Friday   8 am to 5 pm    [place]: Doc 12: [phone]    [place]:  [phone]

## 2022-03-02 NOTE — PROGRESS NOTES
CARDIOLOGY NOTE      3/2/2022    RE: Varun Sanz  (1945)                               TO:  Dr. Doroteo Wellington MD            CHIEF COMPLAINT   Michelle Hanley is a 64 y.o. female who was seen today for management of coronary artery disease                                    HPI:                   Pt has h/o coronary disease, hypertension, hyperlipidemia, seen today for follow-up. Pt has no cardiac complaint, patient has been having increasing shortness of breath has no chest pain there is dyspnea is new for her    Varun Sanz has the following history recorded in care path:  Patient Active Problem List    Diagnosis Date Noted    HTN (hypertension)     Chest pain 06/07/2013    CAD (coronary artery disease)     History of PTCA 1     Hyperlipidemia     H/O cardiac catheterization      Current Outpatient Medications   Medication Sig Dispense Refill    L-Lysine 1000 MG TABS by NOT APPLICABLE route      lisinopril (PRINIVIL;ZESTRIL) 5 MG tablet Take 5 mg by mouth daily      ondansetron (ZOFRAN ODT) 4 MG disintegrating tablet Take 1 tablet by mouth every 8 hours as needed for Nausea (Patient not taking: Reported on 8/20/2021) 15 tablet 0    isosorbide mononitrate (IMDUR) 60 MG extended release tablet Take 1.5 tablets by mouth daily 45 tablet 11    nitroGLYCERIN (NITROSTAT) 0.4 MG SL tablet Place 1 tablet under the tongue every 5 minutes as needed for Chest pain 25 tablet 2    metoprolol (LOPRESSOR) 50 MG tablet Take 1 tablet by mouth 2 times daily 90 tablet 2    alendronate (FOSAMAX) 70 MG tablet   Take 70 mg by mouth every 7 days       Omega-3 Fatty Acids (FISH OIL) 1000 MG CAPS Take 1,000 mg by mouth 2 times daily      Multiple Vitamins-Minerals (MULTIVITAMIN PO) Take 1 tablet by mouth daily      omeprazole (PRILOSEC) 40 MG capsule Take 40 mg by mouth daily.  calcium carbonate 600 MG TABS tablet Take 1 tablet by mouth 2 times daily.       pravastatin (PRAVACHOL) 40 MG tablet Take 1 tablet by mouth daily. 90 tablet 3    aspirin 81 MG tablet Take 81 mg by mouth daily. No current facility-administered medications for this visit. Allergies: Naproxen  Past Medical History:   Diagnosis Date    Arthritis     Blood transfusion     CAD (coronary artery disease)     H/O cardiac catheterization     3-3-2010 normal  stent present in LAD patent-rest of vessels w/out disease     H/O cardiovascular stress test 2020    EF 60% normal stress test    History of nuclear stress test 2016    cardiolite-normal,EF69%    History of PTCA 1     3- PTCA w/ Taxus stent to LAD; PTCA of diagonal vessel    HTN (hypertension)     Hx of cardiac arrhythmia     hx given by patient ? what    Hx of cardiovascular stress test 2015    lexiscan-normal,EF70%    Hx of cardiovascular stress test 2018    EF 70%  Normal study.  Hx of echocardiogram     3-2010 EF>55%;     Hyperlipidemia     IBS (irritable bowel syndrome)     with constipation     Past Surgical History:   Procedure Laterality Date    ABDOMEN SURGERY      Exploratory Lap, cyst ruptured.  ABDOMINAL EXPLORATION SURGERY      ovarian cyst removed    BREAST ENHANCEMENT SURGERY      b/l    BREAST LUMPECTOMY  1984    left     COLONOSCOPY  2012    Normal exam    COLONOSCOPY  2019    polyps, grade 1 int hem, bx of t1, call in one week    COLONOSCOPY N/A 2019    COLONOSCOPY POLYPECTOMY SNARE/COLD BIOPSY performed by Rodolfo Juarez MD at [place] [address]        As reviewed   Family History   Problem Relation Age of Onset    Heart Disease Mother     Other Father         Alzheimer's    Breast Cancer Sister     Stroke Daughter     Seizures Daughter      Social History     Tobacco Use    Smoking status: Former Smoker     Quit date: 1984     Years since quittin.1    Smokeless tobacco: Never Used   Substance Use Topics    Alcohol use:  Yes     Alcohol/week: 0.0 standard drinks     Comment: Rarely. Caffeine: decaf coke and tea         Objective: There were no vitals filed for this visit. LMP 01/01/1998 (Approximate)     No flowsheet data found. Wt Readings from Last 3 Encounters:   10/17/21 119 lb (54 kg)   08/20/21 125 lb 12.8 oz (57.1 kg)   02/26/21 139 lb (63 kg)     There is no height or weight on file to calculate BMI. GENERAL - Alert, oriented, pleasant, in no apparent distress. EYES: No jaundice, no conjunctival pallor. SKIN: It is warm & dry. No rashes. No Echhymosis    HEENT - No clinically significant abnormalities seen. Neck - Supple. No jugular venous distention noted. No carotid bruits. Cardiovascular - Normal S1 and S2 without obvious murmur or gallop. Extremities - No cyanosis, clubbing, or significant edema. Pulmonary - No respiratory distress. No wheezes or rales. Abdomen - No masses, tenderness, or organomegaly. Musculoskeletal - No significant edema. No joint deformities. No muscle wasting. Neurologic - Cranial nerves II through XII are grossly intact. There were no gross focal neurologic abnormalities.     Lab Review   Lab Results   Component Value Date    TROPONINT <0.010 10/17/2021     BNP:  No results found for: BNP  PT/INR:    Lab Results   Component Value Date    INR 1.10 10/17/2021     No results found for: LABA1C  Lab Results   Component Value Date    WBC 9.3 10/17/2021    HCT 37.9 10/17/2021    MCV 83.7 10/17/2021     10/17/2021     Lab Results   Component Value Date    CHOL 1465 03/18/2019    TRIG 167 03/18/2019    HDL 38 03/18/2019    LDLCALC 94 03/18/2019    LDLDIRECT 73 09/25/2012     Lab Results   Component Value Date    ALT 8 (L) 10/17/2021    AST 15 10/17/2021     BMP:    Lab Results   Component Value Date     10/17/2021    K 4.1 10/17/2021    CL 97 10/17/2021    CO2 28 10/17/2021    BUN 16 10/17/2021    CREATININE 0.8 10/17/2021     CMP:   Lab Results   Component Value Date     10/17/2021    K 4.1 10/17/2021    CL 97 10/17/2021    CO2 28 10/17/2021    BUN 16 10/17/2021    PROT 7.4 10/17/2021    PROT 6.6 03/21/2018     TSH:  No results found for: TSH, TSHHS        Assessment & Plan:               -     CORONARY ARTERY DISEASE:  asymptomatic     All available  tests in chart reviewed. Management discussed . Testing ordered  no              On metoprolol Imdur and aspirin compliant                   -  Hypertension: Patients blood pressure is normal. Patient is advised about low sodium diet. Present medical regimen will not be changed. On metoprolol Imdur lisinopril blood pressure stable             -  LIPID MANAGEMENT:  Importance of lipid levels discussed with patient   and patient was given dietary advice. NCEP- ATP III guidelines reviewed with patient. -   Changes  in medicines made: No     Pravachol 40 mg p.o. daily              LDL 94 patient compliant           - sob; had a stress test done not too long ago which was unremarkable however she is having shortness of breath hence will obtain an echocardiogram for further assessment  Which we will check an EKG today          Arsen Lewis MD    [facility]    Please note this report has been partially produced using speech recognition software and may contain errors related to that system including errors in grammar, punctuation, and spelling, as well as words and phrases that may be inappropriate. If there are any questions or concerns please feel free to contact the dictating provider for clarification.

## 2022-03-16 ENCOUNTER — PROCEDURE VISIT (OUTPATIENT)
Dept: CARDIOLOGY CLINIC | Age: 77
End: 2022-03-16
Payer: COMMERCIAL

## 2022-03-16 DIAGNOSIS — R06.02 SOB (SHORTNESS OF BREATH): ICD-10-CM

## 2022-03-16 DIAGNOSIS — R07.89 OTHER CHEST PAIN: ICD-10-CM

## 2022-03-16 DIAGNOSIS — I25.10 CORONARY ARTERY DISEASE INVOLVING NATIVE CORONARY ARTERY OF NATIVE HEART WITHOUT ANGINA PECTORIS: ICD-10-CM

## 2022-03-16 LAB
LV EF: 58 %
LVEF MODALITY: NORMAL

## 2022-03-16 PROCEDURE — 93306 TTE W/DOPPLER COMPLETE: CPT | Performed by: INTERNAL MEDICINE

## 2022-03-18 ENCOUNTER — TELEPHONE (OUTPATIENT)
Dept: CARDIOLOGY CLINIC | Age: 77
End: 2022-03-18

## 2022-09-08 ENCOUNTER — OFFICE VISIT (OUTPATIENT)
Dept: CARDIOLOGY CLINIC | Age: 77
End: 2022-09-08
Payer: COMMERCIAL

## 2022-09-08 VITALS
SYSTOLIC BLOOD PRESSURE: 118 MMHG | WEIGHT: 130.4 LBS | HEART RATE: 65 BPM | DIASTOLIC BLOOD PRESSURE: 80 MMHG | HEIGHT: 63 IN | BODY MASS INDEX: 23.11 KG/M2

## 2022-09-08 DIAGNOSIS — I25.10 CORONARY ARTERY DISEASE INVOLVING NATIVE CORONARY ARTERY OF NATIVE HEART WITHOUT ANGINA PECTORIS: Primary | ICD-10-CM

## 2022-09-08 DIAGNOSIS — E78.5 HYPERLIPIDEMIA, UNSPECIFIED HYPERLIPIDEMIA TYPE: ICD-10-CM

## 2022-09-08 DIAGNOSIS — I10 PRIMARY HYPERTENSION: ICD-10-CM

## 2022-09-08 PROCEDURE — 99214 OFFICE O/P EST MOD 30 MIN: CPT | Performed by: NURSE PRACTITIONER

## 2022-09-08 PROCEDURE — 1123F ACP DISCUSS/DSCN MKR DOCD: CPT | Performed by: NURSE PRACTITIONER

## 2022-09-08 RX ORDER — METOPROLOL SUCCINATE 50 MG/1
TABLET, EXTENDED RELEASE ORAL
COMMUNITY
Start: 2022-06-28

## 2022-09-08 RX ORDER — DICYCLOMINE HCL 20 MG
TABLET ORAL
COMMUNITY
Start: 2022-06-28

## 2022-09-08 ASSESSMENT — ENCOUNTER SYMPTOMS
ORTHOPNEA: 0
SHORTNESS OF BREATH: 0

## 2022-09-08 NOTE — PATIENT INSTRUCTIONS
Please be informed that if you contact our office outside of normal business hours the physician on call cannot help with any scheduling or rescheduling issues, procedure instruction questions or any type of medication issue. We advise you for any urgent/emergency that you go to the nearest emergency room! PLEASE CALL OUR OFFICE DURING NORMAL BUSINESS HOURS    Monday - Friday   8 am to 5 pm    [place]: [place] [address]: [phone]    [place]:  [phone]    **It is YOUR responsibilty to bring medication bottles and/or updated medication list to [address] [place] [address].  This will allow us to better serve you and all your healthcare needs**   lisinopril to 2.5 mg a day

## 2022-09-08 NOTE — PROGRESS NOTES
9/8/2022  Primary cardiologist: Dr. Ashley Victor:   Emeterio Zamarripa  is an established 64 y.o.  female here for a 6 month follow up on coronary artery disease, hypertension, hyperlipidemia      SUBJECTIVE/OBJECTIVE:  Emeterio Zamarripa is a 64 y.o. female with a history of coronary artery disease, PCI, hypertension, hyperlipidemia      HPI :   Emeterio Zamarripa reports she has occasional shortness of breath with exertion. She also notes occasional lightheadedness lightheadedness occurs at random times not associated with position change. She notes after standing on her feet for long periods of time she will notice swelling in her ankles. She denies chest pain    Review of Systems   Constitutional: Negative for diaphoresis and malaise/fatigue. Cardiovascular:  Positive for dyspnea on exertion and leg swelling. Negative for chest pain, claudication, irregular heartbeat, near-syncope, orthopnea, palpitations and paroxysmal nocturnal dyspnea. Respiratory:  Negative for shortness of breath. Neurological:  Positive for dizziness and light-headedness. Vitals:    09/08/22 0947   BP: 118/80   Site: Right Upper Arm   Position: Sitting   Cuff Size: Medium Adult   Pulse: 65   Weight: 130 lb 6.4 oz (59.1 kg)   Height: 5' 3\" (1.6 m)     No flowsheet data found. Wt Readings from Last 3 Encounters:   09/08/22 130 lb 6.4 oz (59.1 kg)   03/02/22 129 lb 9.6 oz (58.8 kg)   10/17/21 119 lb (54 kg)     Body mass index is 23.1 kg/m². Physical Exam  Vitals reviewed. Constitutional:       Appearance: Normal appearance. HENT:      Head: Normocephalic and atraumatic. Eyes:      Extraocular Movements: Extraocular movements intact. Pupils: Pupils are equal, round, and reactive to light. Neck:      Vascular: No carotid bruit. Cardiovascular:      Rate and Rhythm: Normal rate and regular rhythm. Pulses: Normal pulses. Pulmonary:      Effort: Pulmonary effort is normal.      Breath sounds: Normal breath sounds. No rales.    Chest: Chest wall: No tenderness. Abdominal:      General: There is no distension. Palpations: Abdomen is soft. Tenderness: There is no abdominal tenderness. Musculoskeletal:      Cervical back: No tenderness. Right lower leg: No edema. Left lower leg: No edema. Skin:     General: Skin is warm and dry. Capillary Refill: Capillary refill takes less than 2 seconds. Comments: telangiectasias lower legs   Neurological:      General: No focal deficit present. Mental Status: She is alert and oriented to person, place, and time. Psychiatric:         Mood and Affect: Mood normal.         Behavior: Behavior normal.              Current Outpatient Medications   Medication Sig Dispense Refill    dicyclomine (BENTYL) 20 MG tablet TAKE 1 TABLET BY MOUTH EVERY 6 HOURS AS NEEDED      metoprolol succinate (TOPROL XL) 50 MG extended release tablet TAKE 1 TABLET BY MOUTH AT BEDTIME      L-Lysine 1000 MG TABS by NOT APPLICABLE route      lisinopril (PRINIVIL;ZESTRIL) 5 MG tablet Take 5 mg by mouth daily      isosorbide mononitrate (IMDUR) 60 MG extended release tablet Take 1.5 tablets by mouth daily 45 tablet 11    nitroGLYCERIN (NITROSTAT) 0.4 MG SL tablet Place 1 tablet under the tongue every 5 minutes as needed for Chest pain 25 tablet 2    alendronate (FOSAMAX) 70 MG tablet   Take 70 mg by mouth every 7 days       Omega-3 Fatty Acids (FISH OIL) 1000 MG CAPS Take 1,000 mg by mouth 2 times daily      Multiple Vitamins-Minerals (MULTIVITAMIN PO) Take 1 tablet by mouth daily      omeprazole (PRILOSEC) 40 MG capsule Take 40 mg by mouth daily. calcium carbonate 600 MG TABS tablet Take 1 tablet by mouth 2 times daily. pravastatin (PRAVACHOL) 40 MG tablet Take 1 tablet by mouth daily. 90 tablet 3    aspirin 81 MG tablet Take 81 mg by mouth daily.         ondansetron (ZOFRAN ODT) 4 MG disintegrating tablet Take 1 tablet by mouth every 8 hours as needed for Nausea (Patient not taking: Reported on 9/8/2022) 15 tablet 0    metoprolol (LOPRESSOR) 50 MG tablet Take 1 tablet by mouth 2 times daily (Patient not taking: Reported on 9/8/2022) 90 tablet 2     No current facility-administered medications for this visit. All pertinent data reviewed and discussed with patient       ASSESSMENT/PLAN:    Coronary artery disease  Stable from cardiovascular standpoint. Remains asymptomatic. Has intermittent episodes of shortness of breath which she states has been ongoing and unchanged.  :continue with medical management including ASA Imdur toprol    Hypertension   Blood pressure is  controlled - she has episodes of dizziness with standing for long periods- will decrease lisinopril to 2.5 mg-      Dyslipidemia     Lipids noted from [facility] from 06/2022  Lipids are at or near goal  She is on omega 3 and pravastatin. No reported adverse events or reported side effects from medication(s) : no change in medications   encouraged healthy eating habits including low fat -low /cholesterol diet                           Dizziness  Suspect she may be orthostatic  Decrease ACE  Use compression socks    Tests ordered:  none  Follow-up    1 year                                                                    Signed:  RACHELL Ashton CNP, 9/8/2022, 10:09 AM    An electronic signature was used to authenticate this note. Please note this report has been partially produced using speech recognition software and may contain errors related to that system including errors in grammar, punctuation, and spelling, as well as words and phrases that may be inappropriate. If there are any questions or concerns please feel free to contact the dictating provider for clarification.

## 2023-05-08 ENCOUNTER — OFFICE VISIT (OUTPATIENT)
Dept: CARDIOLOGY CLINIC | Age: 78
End: 2023-05-08
Payer: COMMERCIAL

## 2023-05-08 VITALS
HEART RATE: 62 BPM | SYSTOLIC BLOOD PRESSURE: 190 MMHG | HEIGHT: 63 IN | BODY MASS INDEX: 23.39 KG/M2 | DIASTOLIC BLOOD PRESSURE: 80 MMHG | WEIGHT: 132 LBS

## 2023-05-08 DIAGNOSIS — I10 HYPERTENSION, UNSPECIFIED TYPE: Primary | ICD-10-CM

## 2023-05-08 PROCEDURE — 3079F DIAST BP 80-89 MM HG: CPT | Performed by: INTERNAL MEDICINE

## 2023-05-08 PROCEDURE — 99214 OFFICE O/P EST MOD 30 MIN: CPT | Performed by: INTERNAL MEDICINE

## 2023-05-08 PROCEDURE — 3077F SYST BP >= 140 MM HG: CPT | Performed by: INTERNAL MEDICINE

## 2023-05-08 PROCEDURE — 1123F ACP DISCUSS/DSCN MKR DOCD: CPT | Performed by: INTERNAL MEDICINE

## 2023-05-08 RX ORDER — ISOSORBIDE MONONITRATE 120 MG/1
120 TABLET, EXTENDED RELEASE ORAL DAILY
Qty: 90 TABLET | Refills: 3 | Status: SHIPPED | OUTPATIENT
Start: 2023-05-08

## 2023-05-09 ENCOUNTER — TELEPHONE (OUTPATIENT)
Dept: CARDIOLOGY CLINIC | Age: 78
End: 2023-05-09

## 2023-05-09 NOTE — TELEPHONE ENCOUNTER
Discussed with Gage Graves NP, patient to increase Imdur and report back in few days. Patient advised, has been out of Imdur and pharmacy back ordered. [facility]

## 2023-05-09 NOTE — TELEPHONE ENCOUNTER
Patient called she was under the impression we were increasing her  Metoprolol , her bp was elevated in the office , today its 193/95

## 2023-05-11 ENCOUNTER — TELEPHONE (OUTPATIENT)
Dept: CARDIOLOGY CLINIC | Age: 78
End: 2023-05-11

## 2023-10-13 ENCOUNTER — APPOINTMENT (OUTPATIENT)
Dept: CT IMAGING | Age: 78
End: 2023-10-13
Payer: COMMERCIAL

## 2023-10-13 ENCOUNTER — APPOINTMENT (OUTPATIENT)
Dept: GENERAL RADIOLOGY | Age: 78
End: 2023-10-13
Attending: EMERGENCY MEDICINE
Payer: COMMERCIAL

## 2023-10-13 ENCOUNTER — HOSPITAL ENCOUNTER (OUTPATIENT)
Age: 78
Setting detail: OBSERVATION
Discharge: HOME | End: 2023-10-14
Attending: EMERGENCY MEDICINE | Admitting: STUDENT IN AN ORGANIZED HEALTH CARE EDUCATION/TRAINING PROGRAM
Payer: COMMERCIAL

## 2023-10-13 ENCOUNTER — TELEPHONE (OUTPATIENT)
Dept: CARDIOLOGY CLINIC | Age: 78
End: 2023-10-13

## 2023-10-13 DIAGNOSIS — R03.0 ELEVATED BLOOD PRESSURE READING: ICD-10-CM

## 2023-10-13 DIAGNOSIS — R06.09 DYSPNEA ON EXERTION: Primary | ICD-10-CM

## 2023-10-13 LAB
ALBUMIN SERPL-MCNC: 4.7 GM/DL (ref 3.4–5)
ALP BLD-CCNC: 75 IU/L (ref 40–128)
ALT SERPL-CCNC: 15 U/L (ref 10–40)
ANION GAP SERPL CALCULATED.3IONS-SCNC: 11 MMOL/L (ref 4–16)
AST SERPL-CCNC: 23 IU/L (ref 15–37)
BASOPHILS ABSOLUTE: 0.1 K/CU MM
BASOPHILS RELATIVE PERCENT: 0.8 % (ref 0–1)
BILIRUB SERPL-MCNC: 0.3 MG/DL (ref 0–1)
BUN SERPL-MCNC: 20 MG/DL (ref 6–23)
CALCIUM SERPL-MCNC: 10.2 MG/DL (ref 8.3–10.6)
CHLORIDE BLD-SCNC: 103 MMOL/L (ref 99–110)
CO2: 26 MMOL/L (ref 21–32)
CREAT SERPL-MCNC: 0.6 MG/DL (ref 0.6–1.1)
DIFFERENTIAL TYPE: ABNORMAL
EOSINOPHILS ABSOLUTE: 0.2 K/CU MM
EOSINOPHILS RELATIVE PERCENT: 2.9 % (ref 0–3)
GFR SERPL CREATININE-BSD FRML MDRD: >60 ML/MIN/1.73M2
GLUCOSE SERPL-MCNC: 105 MG/DL (ref 70–99)
HCT VFR BLD CALC: 37.7 % (ref 37–47)
HEMOGLOBIN: 12.3 GM/DL (ref 12.5–16)
IMMATURE NEUTROPHIL %: 0.3 % (ref 0–0.43)
LYMPHOCYTES ABSOLUTE: 1.4 K/CU MM
LYMPHOCYTES RELATIVE PERCENT: 20.8 % (ref 24–44)
MCH RBC QN AUTO: 28.6 PG (ref 27–31)
MCHC RBC AUTO-ENTMCNC: 32.6 % (ref 32–36)
MCV RBC AUTO: 87.7 FL (ref 78–100)
MONOCYTES ABSOLUTE: 0.5 K/CU MM
MONOCYTES RELATIVE PERCENT: 8.3 % (ref 0–4)
NUCLEATED RBC %: 0 %
PDW BLD-RTO: 13 % (ref 11.7–14.9)
PLATELET # BLD: 230 K/CU MM (ref 140–440)
PMV BLD AUTO: 10.5 FL (ref 7.5–11.1)
POTASSIUM SERPL-SCNC: 3.8 MMOL/L (ref 3.5–5.1)
PRO-BNP: 257.5 PG/ML
RBC # BLD: 4.3 M/CU MM (ref 4.2–5.4)
SEGMENTED NEUTROPHILS ABSOLUTE COUNT: 4.4 K/CU MM
SEGMENTED NEUTROPHILS RELATIVE PERCENT: 66.9 % (ref 36–66)
SODIUM BLD-SCNC: 140 MMOL/L (ref 135–145)
T4 FREE SERPL-MCNC: 1.21 NG/DL (ref 0.9–1.8)
TOTAL IMMATURE NEUTOROPHIL: 0.02 K/CU MM
TOTAL NUCLEATED RBC: 0 K/CU MM
TOTAL PROTEIN: 7.5 GM/DL (ref 6.4–8.2)
TROPONIN, HIGH SENSITIVITY: 6 NG/L (ref 0–14)
TROPONIN, HIGH SENSITIVITY: 7 NG/L (ref 0–14)
TSH SERPL DL<=0.005 MIU/L-ACNC: 4.78 UIU/ML (ref 0.27–4.2)
WBC # BLD: 6.5 K/CU MM (ref 4–10.5)

## 2023-10-13 PROCEDURE — 84484 ASSAY OF TROPONIN QUANT: CPT

## 2023-10-13 PROCEDURE — 70450 CT HEAD/BRAIN W/O DYE: CPT

## 2023-10-13 PROCEDURE — 83880 ASSAY OF NATRIURETIC PEPTIDE: CPT

## 2023-10-13 PROCEDURE — 80053 COMPREHEN METABOLIC PANEL: CPT

## 2023-10-13 PROCEDURE — 84443 ASSAY THYROID STIM HORMONE: CPT

## 2023-10-13 PROCEDURE — 85025 COMPLETE CBC W/AUTO DIFF WBC: CPT

## 2023-10-13 PROCEDURE — 99285 EMERGENCY DEPT VISIT HI MDM: CPT

## 2023-10-13 PROCEDURE — 93005 ELECTROCARDIOGRAM TRACING: CPT | Performed by: EMERGENCY MEDICINE

## 2023-10-13 PROCEDURE — 96374 THER/PROPH/DIAG INJ IV PUSH: CPT

## 2023-10-13 PROCEDURE — 6360000002 HC RX W HCPCS: Performed by: EMERGENCY MEDICINE

## 2023-10-13 PROCEDURE — 2580000003 HC RX 258: Performed by: STUDENT IN AN ORGANIZED HEALTH CARE EDUCATION/TRAINING PROGRAM

## 2023-10-13 PROCEDURE — G0378 HOSPITAL OBSERVATION PER HR: HCPCS

## 2023-10-13 PROCEDURE — 6370000000 HC RX 637 (ALT 250 FOR IP): Performed by: STUDENT IN AN ORGANIZED HEALTH CARE EDUCATION/TRAINING PROGRAM

## 2023-10-13 PROCEDURE — 84439 ASSAY OF FREE THYROXINE: CPT

## 2023-10-13 PROCEDURE — 71045 X-RAY EXAM CHEST 1 VIEW: CPT

## 2023-10-13 RX ORDER — ISOSORBIDE MONONITRATE 60 MG/1
120 TABLET, EXTENDED RELEASE ORAL DAILY
Status: DISCONTINUED | OUTPATIENT
Start: 2023-10-13 | End: 2023-10-14 | Stop reason: HOSPADM

## 2023-10-13 RX ORDER — PRAVASTATIN SODIUM 40 MG
40 TABLET ORAL DAILY
Status: DISCONTINUED | OUTPATIENT
Start: 2023-10-14 | End: 2023-10-14 | Stop reason: HOSPADM

## 2023-10-13 RX ORDER — SODIUM CHLORIDE 0.9 % (FLUSH) 0.9 %
5-40 SYRINGE (ML) INJECTION EVERY 12 HOURS SCHEDULED
Status: DISCONTINUED | OUTPATIENT
Start: 2023-10-13 | End: 2023-10-14 | Stop reason: HOSPADM

## 2023-10-13 RX ORDER — LABETALOL HYDROCHLORIDE 5 MG/ML
10 INJECTION, SOLUTION INTRAVENOUS ONCE
Status: COMPLETED | OUTPATIENT
Start: 2023-10-13 | End: 2023-10-13

## 2023-10-13 RX ORDER — LISINOPRIL 5 MG/1
5 TABLET ORAL DAILY
Status: DISCONTINUED | OUTPATIENT
Start: 2023-10-13 | End: 2023-10-13

## 2023-10-13 RX ORDER — SODIUM CHLORIDE 9 MG/ML
INJECTION, SOLUTION INTRAVENOUS PRN
Status: DISCONTINUED | OUTPATIENT
Start: 2023-10-13 | End: 2023-10-14 | Stop reason: HOSPADM

## 2023-10-13 RX ORDER — ONDANSETRON 2 MG/ML
4 INJECTION INTRAMUSCULAR; INTRAVENOUS EVERY 6 HOURS PRN
Status: DISCONTINUED | OUTPATIENT
Start: 2023-10-13 | End: 2023-10-14 | Stop reason: HOSPADM

## 2023-10-13 RX ORDER — POTASSIUM CHLORIDE 7.45 MG/ML
10 INJECTION INTRAVENOUS PRN
Status: DISCONTINUED | OUTPATIENT
Start: 2023-10-13 | End: 2023-10-14 | Stop reason: HOSPADM

## 2023-10-13 RX ORDER — ONDANSETRON 4 MG/1
4 TABLET, ORALLY DISINTEGRATING ORAL EVERY 8 HOURS PRN
Status: DISCONTINUED | OUTPATIENT
Start: 2023-10-13 | End: 2023-10-14 | Stop reason: HOSPADM

## 2023-10-13 RX ORDER — MAGNESIUM SULFATE IN WATER 40 MG/ML
2000 INJECTION, SOLUTION INTRAVENOUS PRN
Status: DISCONTINUED | OUTPATIENT
Start: 2023-10-13 | End: 2023-10-14 | Stop reason: HOSPADM

## 2023-10-13 RX ORDER — ASPIRIN 81 MG/1
81 TABLET, CHEWABLE ORAL DAILY
Status: DISCONTINUED | OUTPATIENT
Start: 2023-10-13 | End: 2023-10-14 | Stop reason: HOSPADM

## 2023-10-13 RX ORDER — NITROGLYCERIN 0.4 MG/1
0.4 TABLET SUBLINGUAL EVERY 5 MIN PRN
Status: DISCONTINUED | OUTPATIENT
Start: 2023-10-13 | End: 2023-10-14 | Stop reason: HOSPADM

## 2023-10-13 RX ORDER — POLYETHYLENE GLYCOL 3350 17 G/17G
17 POWDER, FOR SOLUTION ORAL DAILY PRN
Status: DISCONTINUED | OUTPATIENT
Start: 2023-10-13 | End: 2023-10-14 | Stop reason: HOSPADM

## 2023-10-13 RX ORDER — PANTOPRAZOLE SODIUM 40 MG/1
40 TABLET, DELAYED RELEASE ORAL
Status: DISCONTINUED | OUTPATIENT
Start: 2023-10-13 | End: 2023-10-14 | Stop reason: HOSPADM

## 2023-10-13 RX ORDER — SODIUM CHLORIDE 0.9 % (FLUSH) 0.9 %
5-40 SYRINGE (ML) INJECTION PRN
Status: DISCONTINUED | OUTPATIENT
Start: 2023-10-13 | End: 2023-10-14 | Stop reason: HOSPADM

## 2023-10-13 RX ORDER — ACETAMINOPHEN 325 MG/1
650 TABLET ORAL EVERY 6 HOURS PRN
Status: DISCONTINUED | OUTPATIENT
Start: 2023-10-13 | End: 2023-10-14 | Stop reason: HOSPADM

## 2023-10-13 RX ORDER — LISINOPRIL 20 MG/1
20 TABLET ORAL DAILY
Status: DISCONTINUED | OUTPATIENT
Start: 2023-10-14 | End: 2023-10-14

## 2023-10-13 RX ORDER — ACETAMINOPHEN 650 MG/1
650 SUPPOSITORY RECTAL EVERY 6 HOURS PRN
Status: DISCONTINUED | OUTPATIENT
Start: 2023-10-13 | End: 2023-10-14 | Stop reason: HOSPADM

## 2023-10-13 RX ORDER — METOPROLOL SUCCINATE 50 MG/1
50 TABLET, EXTENDED RELEASE ORAL NIGHTLY
Status: DISCONTINUED | OUTPATIENT
Start: 2023-10-13 | End: 2023-10-14 | Stop reason: HOSPADM

## 2023-10-13 RX ORDER — ENOXAPARIN SODIUM 100 MG/ML
40 INJECTION SUBCUTANEOUS EVERY EVENING
Status: DISCONTINUED | OUTPATIENT
Start: 2023-10-14 | End: 2023-10-14 | Stop reason: HOSPADM

## 2023-10-13 RX ADMIN — ISOSORBIDE MONONITRATE 120 MG: 60 TABLET, EXTENDED RELEASE ORAL at 22:39

## 2023-10-13 RX ADMIN — LISINOPRIL 5 MG: 5 TABLET ORAL at 22:39

## 2023-10-13 RX ADMIN — SODIUM CHLORIDE, PRESERVATIVE FREE 10 ML: 5 INJECTION INTRAVENOUS at 22:41

## 2023-10-13 RX ADMIN — METOPROLOL SUCCINATE 50 MG: 50 TABLET, EXTENDED RELEASE ORAL at 22:39

## 2023-10-13 RX ADMIN — LISINOPRIL 20 MG: 20 TABLET ORAL at 23:57

## 2023-10-13 RX ADMIN — ASPIRIN 81 MG: 81 TABLET, CHEWABLE ORAL at 22:39

## 2023-10-13 RX ADMIN — PANTOPRAZOLE SODIUM 40 MG: 40 TABLET, DELAYED RELEASE ORAL at 23:15

## 2023-10-13 RX ADMIN — LABETALOL HYDROCHLORIDE 10 MG: 5 INJECTION, SOLUTION INTRAVENOUS at 20:06

## 2023-10-13 ASSESSMENT — LIFESTYLE VARIABLES
HOW MANY STANDARD DRINKS CONTAINING ALCOHOL DO YOU HAVE ON A TYPICAL DAY: PATIENT DOES NOT DRINK
HOW OFTEN DO YOU HAVE A DRINK CONTAINING ALCOHOL: NEVER

## 2023-10-13 ASSESSMENT — PAIN SCALES - GENERAL: PAINLEVEL_OUTOF10: 0

## 2023-10-13 NOTE — TELEPHONE ENCOUNTER
Patient called her blood pressure has been elevated today this morning it was 192/96 came down to 180/89 , she has a headache   Just doesn't feel good

## 2023-10-13 NOTE — ED PROVIDER NOTES
Emergency Department Encounter    Patient: Grisel Bae  MRN: [id number]  : 1945  Date of Evaluation: 10/13/2023  ED Provider:  Jae Barrera MD    Triage Chief Complaint:   Hypertension (Pressure in the neck and headache )    [place]:  Grisel Bae is a 76 y.o. female that presents with elevated blood pressures for the last 3 weeks. Has had mild headaches. Denies falls or trauma. Has some pain in her upper chest and lower neck that is not exertional      Has been more SOB with exertional       Last stress test 2 years ago. Has a history of heart stent in . No chest or neck pain now. ROS - see HPI, below listed is current ROS at time of my eval:  10 systems reviewed and negative except as above. Past Medical History:   Diagnosis Date    Arthritis     Blood transfusion     CAD (coronary artery disease)     H/O cardiac catheterization     3-3-2010 normal  stent present in LAD patent-rest of vessels w/out disease     H/O cardiovascular stress test 2020    EF 60% normal stress test    History of nuclear stress test 2016    cardiolite-normal,EF69%    History of PTCA 1     3- PTCA w/ Taxus stent to LAD; PTCA of diagonal vessel    HTN (hypertension)     Hx of cardiac arrhythmia     hx given by patient ? what    Hx of cardiovascular stress test 2015    lexiscan-normal,EF70%    Hx of cardiovascular stress test 2018    EF 70%  Normal study. Hx of echocardiogram     3-2010 EF>55%;     Hyperlipidemia     IBS (irritable bowel syndrome)     with constipation     Past Surgical History:   Procedure Laterality Date    ABDOMEN SURGERY  's    Exploratory Lap, cyst ruptured.     ABDOMINAL EXPLORATION SURGERY      ovarian cyst removed    BREAST ENHANCEMENT SURGERY      b/l    BREAST LUMPECTOMY  1984    left     COLONOSCOPY  2012    Normal exam    COLONOSCOPY  2019    polyps, grade 1 int hem, bx of t1, call in one week

## 2023-10-13 NOTE — TELEPHONE ENCOUNTER
Patient states she is already taking 10mg Lisinopril.   She is currently over 935 systolic and on her way to [facility]

## 2023-10-14 VITALS
DIASTOLIC BLOOD PRESSURE: 50 MMHG | OXYGEN SATURATION: 96 % | HEART RATE: 63 BPM | RESPIRATION RATE: 14 BRPM | SYSTOLIC BLOOD PRESSURE: 144 MMHG | TEMPERATURE: 98.3 F | HEIGHT: 63 IN | BODY MASS INDEX: 22.5 KG/M2 | WEIGHT: 127 LBS

## 2023-10-14 PROBLEM — R06.09 DYSPNEA ON EXERTION: Status: ACTIVE | Noted: 2023-10-14

## 2023-10-14 LAB
ANION GAP SERPL CALCULATED.3IONS-SCNC: 10 MMOL/L (ref 4–16)
BASOPHILS ABSOLUTE: 0.1 K/CU MM
BASOPHILS RELATIVE PERCENT: 1 % (ref 0–1)
BUN SERPL-MCNC: 18 MG/DL (ref 6–23)
CALCIUM SERPL-MCNC: 9.4 MG/DL (ref 8.3–10.6)
CHLORIDE BLD-SCNC: 107 MMOL/L (ref 99–110)
CO2: 24 MMOL/L (ref 21–32)
CREAT SERPL-MCNC: 0.6 MG/DL (ref 0.6–1.1)
DIFFERENTIAL TYPE: ABNORMAL
EKG ATRIAL RATE: 68 BPM
EKG DIAGNOSIS: NORMAL
EKG P AXIS: 43 DEGREES
EKG P-R INTERVAL: 160 MS
EKG Q-T INTERVAL: 390 MS
EKG QRS DURATION: 76 MS
EKG QTC CALCULATION (BAZETT): 414 MS
EKG R AXIS: -5 DEGREES
EKG T AXIS: 49 DEGREES
EKG VENTRICULAR RATE: 68 BPM
EOSINOPHILS ABSOLUTE: 0.2 K/CU MM
EOSINOPHILS RELATIVE PERCENT: 3.6 % (ref 0–3)
GFR SERPL CREATININE-BSD FRML MDRD: >60 ML/MIN/1.73M2
GLUCOSE SERPL-MCNC: 108 MG/DL (ref 70–99)
HCT VFR BLD CALC: 34.9 % (ref 37–47)
HEMOGLOBIN: 11.1 GM/DL (ref 12.5–16)
IMMATURE NEUTROPHIL %: 0.2 % (ref 0–0.43)
INR BLD: 1 INDEX
LYMPHOCYTES ABSOLUTE: 1.7 K/CU MM
LYMPHOCYTES RELATIVE PERCENT: 29.3 % (ref 24–44)
MCH RBC QN AUTO: 28 PG (ref 27–31)
MCHC RBC AUTO-ENTMCNC: 31.8 % (ref 32–36)
MCV RBC AUTO: 88.1 FL (ref 78–100)
MONOCYTES ABSOLUTE: 0.6 K/CU MM
MONOCYTES RELATIVE PERCENT: 9.5 % (ref 0–4)
NUCLEATED RBC %: 0 %
PDW BLD-RTO: 13.1 % (ref 11.7–14.9)
PLATELET # BLD: 195 K/CU MM (ref 140–440)
PMV BLD AUTO: 10.2 FL (ref 7.5–11.1)
POTASSIUM SERPL-SCNC: 3.9 MMOL/L (ref 3.5–5.1)
PROTHROMBIN TIME: 13.3 SECONDS (ref 11.7–14.5)
RBC # BLD: 3.96 M/CU MM (ref 4.2–5.4)
SEGMENTED NEUTROPHILS ABSOLUTE COUNT: 3.3 K/CU MM
SEGMENTED NEUTROPHILS RELATIVE PERCENT: 56.4 % (ref 36–66)
SODIUM BLD-SCNC: 141 MMOL/L (ref 135–145)
TOTAL IMMATURE NEUTOROPHIL: 0.01 K/CU MM
TOTAL NUCLEATED RBC: 0 K/CU MM
WBC # BLD: 5.8 K/CU MM (ref 4–10.5)

## 2023-10-14 PROCEDURE — 6360000002 HC RX W HCPCS: Performed by: NURSE PRACTITIONER

## 2023-10-14 PROCEDURE — 2580000003 HC RX 258: Performed by: STUDENT IN AN ORGANIZED HEALTH CARE EDUCATION/TRAINING PROGRAM

## 2023-10-14 PROCEDURE — 6370000000 HC RX 637 (ALT 250 FOR IP): Performed by: NURSE PRACTITIONER

## 2023-10-14 PROCEDURE — 80048 BASIC METABOLIC PNL TOTAL CA: CPT

## 2023-10-14 PROCEDURE — G0378 HOSPITAL OBSERVATION PER HR: HCPCS

## 2023-10-14 PROCEDURE — 85025 COMPLETE CBC W/AUTO DIFF WBC: CPT

## 2023-10-14 PROCEDURE — 6370000000 HC RX 637 (ALT 250 FOR IP): Performed by: STUDENT IN AN ORGANIZED HEALTH CARE EDUCATION/TRAINING PROGRAM

## 2023-10-14 PROCEDURE — 99204 OFFICE O/P NEW MOD 45 MIN: CPT | Performed by: INTERNAL MEDICINE

## 2023-10-14 PROCEDURE — 85610 PROTHROMBIN TIME: CPT

## 2023-10-14 PROCEDURE — 93010 ELECTROCARDIOGRAM REPORT: CPT | Performed by: INTERNAL MEDICINE

## 2023-10-14 PROCEDURE — 36415 COLL VENOUS BLD VENIPUNCTURE: CPT

## 2023-10-14 PROCEDURE — 96375 TX/PRO/DX INJ NEW DRUG ADDON: CPT

## 2023-10-14 RX ORDER — LISINOPRIL 20 MG/1
40 TABLET ORAL DAILY
Status: DISCONTINUED | OUTPATIENT
Start: 2023-10-15 | End: 2023-10-14

## 2023-10-14 RX ORDER — LISINOPRIL 20 MG/1
20 TABLET ORAL DAILY
Status: DISCONTINUED | OUTPATIENT
Start: 2023-10-15 | End: 2023-10-14 | Stop reason: HOSPADM

## 2023-10-14 RX ORDER — LISINOPRIL 20 MG/1
20 TABLET ORAL DAILY
Qty: 30 TABLET | Refills: 5 | Status: SHIPPED | OUTPATIENT
Start: 2023-10-15 | End: 2023-10-14 | Stop reason: SDUPTHER

## 2023-10-14 RX ORDER — LISINOPRIL 20 MG/1
20 TABLET ORAL ONCE
Status: COMPLETED | OUTPATIENT
Start: 2023-10-14 | End: 2023-10-14

## 2023-10-14 RX ORDER — LISINOPRIL 20 MG/1
20 TABLET ORAL 2 TIMES DAILY
Qty: 60 TABLET | Refills: 5 | Status: SHIPPED | OUTPATIENT
Start: 2023-10-14

## 2023-10-14 RX ORDER — DIPHENHYDRAMINE HYDROCHLORIDE 50 MG/ML
25 INJECTION INTRAMUSCULAR; INTRAVENOUS ONCE
Status: COMPLETED | OUTPATIENT
Start: 2023-10-14 | End: 2023-10-14

## 2023-10-14 RX ADMIN — SODIUM CHLORIDE, PRESERVATIVE FREE 10 ML: 5 INJECTION INTRAVENOUS at 08:41

## 2023-10-14 RX ADMIN — DIPHENHYDRAMINE HYDROCHLORIDE 25 MG: 50 INJECTION, SOLUTION INTRAMUSCULAR; INTRAVENOUS at 05:35

## 2023-10-14 RX ADMIN — PRAVASTATIN SODIUM 40 MG: 40 TABLET ORAL at 08:41

## 2023-10-14 RX ADMIN — ASPIRIN 81 MG: 81 TABLET, CHEWABLE ORAL at 08:41

## 2023-10-14 RX ADMIN — ISOSORBIDE MONONITRATE 120 MG: 60 TABLET, EXTENDED RELEASE ORAL at 08:41

## 2023-10-14 RX ADMIN — SODIUM CHLORIDE, PRESERVATIVE FREE 10 ML: 5 INJECTION INTRAVENOUS at 05:36

## 2023-10-14 RX ADMIN — LISINOPRIL 20 MG: 20 TABLET ORAL at 09:48

## 2023-10-14 ASSESSMENT — PAIN SCALES - GENERAL: PAINLEVEL_OUTOF10: 0

## 2023-10-14 NOTE — CARE COORDINATION
CM - Room # ED-15--[facility] criteria for Chest Pain  reviewed at this time, criteria supports an OBSERVATION admission

## 2023-10-14 NOTE — PROGRESS NOTES
4 Eyes Skin Assessment     NAME:  Lonny Pelaez  YOB: 1945  MEDICAL RECORD NUMBER:  [id number]    The patient is being assess for  Admission    I agree that 2 RN's have performed a thorough Head to Toe Skin Assessment on the patient. ALL assessment sites listed below have been assessed. Areas assessed by both nurses:    Head, Face, Ears, Shoulders, Back, Chest, Arms, Elbows, Hands, Sacrum. Buttock, Coccyx, Ischium, Legs. Feet and Heels, and Under Medical Devices         Does the Patient have a Wound?  No noted wound(s)       [place] Prevention initiated:  NA   Wound Care Orders initiated:  NA    Pressure Injury (Stage 3,4, Unstageable, DTI, NWPT, and Complex wounds) if present place consult order under [de-identified] NA    New and Established Ostomies if present place consult order under : NA      Nurse 1 eSignature: Electronically signed by Claudean Buttner, RN on 10/14/23 at 12:13 AM EDT    **SHARE this note so that the co-signing nurse is able to place an eSignature**    Nurse 2 eSignature: Electronically signed by Sara Judd RN on 10/14/23 at 11:40 AM EDT

## 2023-10-14 NOTE — DISCHARGE SUMMARY
V2.0  Discharge Summary    Name:  Ingris Perea /Age/Sex: 1945 (77 y.o. female)   Admit Date: 10/13/2023  Discharge Date: 10/14/23    MRN & CSN:  [id number] & [id number] Encounter Date and Time 10/14/23 8:19 AM EDT    Attending:  Ollie Odonnell MD Discharging Provider: RACHELL Benoit - CNP       Hospital Course:     Brief HPI: Ingris Perea is a 76 y.o. female with past medical history of CAD SP PCI, hypertension, GERD, IBS who presented with chest pain and elevated blood pressure. Patient's blood pressure was 180s over 100s yesterday. Patient denied headache. Patient's EKG has no acute ischemic change. Troponin is negative. Cardiology consulted. Dr. Ruiz Ask increased lisinopril. Okay to discharge follow-up with cardiology for outpatient stress test.  Patient received 25 Mg lisinopril yesterday evening. Blood pressure is still high around 150s over 90s. Received another dose lisinopril 20 Mg this morning. Blood pressure improved to 144/86. Patient denied dizziness. Prescription of lisinopril 20 Mg twice daily sent to pharmacy. Patient understand she will follow-up with cardiology and PCP. Brief Problem Based Course:   # Chest pain likely secondary to uncontrolled essential hypertension  - Endorsed elevated BP at home for past 3 weeks with upper chest and neck pain, unclear if related to exertion/rest.  - BP >200/80 on arrival to ED, improved after 1 dose of IV Labetolol with significant improvement. Resume home Lisinopril (titrated to 20 mg), Imdur and Toprol-XL, titrate as needed. - Tn negative, repeat. ECG without acute ischemic changes. CTH and CXR nonacute. -Appreciate cardiology input: Okay to discharge, increase lisinopril, outpatient follow-up for stress test     # CAD s/p PTCA   - Continue ASA and statin. # GERD  - Continue home PPI. The patient expressed appropriate understanding of, and agreement with the discharge recommendations, medications, and plan. differentiation is maintained without evidence of an acute infarct. There is diffuse prominence of the cerebral sulci with commensurate ventriculomegaly, consistent with age related cerebral atrophy. There is cerebellar atrophy. ORBITS: The visualized portion of the orbits demonstrate no acute abnormality. SINUSES: There is mucosal thickening of the left maxillary sinus. SOFT TISSUES/SKULL:  No acute abnormality of the visualized skull or soft tissues. No acute intracranial abnormality. CBC:   Recent Labs     10/13/23  1647 10/14/23  0337   WBC 6.5 5.8   HGB 12.3* 11.1*    195     BMP:    Recent Labs     10/13/23  1647 10/14/23  0337    141   K 3.8 3.9    107   CO2 26 24   BUN 20 18   CREATININE 0.6 0.6   GLUCOSE 105* 108*     Hepatic:   Recent Labs     10/13/23  1647   AST 23   ALT 15   BILITOT 0.3   ALKPHOS 75     Lipids:   Lab Results   Component Value Date/Time    CHOL 1465 03/18/2019 12:00 AM    HDL 38 03/18/2019 12:00 AM    TRIG 167 03/18/2019 12:00 AM     Hemoglobin A1C: No results found for: \"LABA1C\"  TSH: No results found for: \"TSH\"  Troponin:   Lab Results   Component Value Date/Time    TROPONINT <0.010 10/17/2021 10:00 AM     Lactic Acid: No results for input(s): \"LACTA\" in the last 72 hours.   BNP:   Recent Labs     10/13/23  2054   PROBNP 257.5     UA:No results found for: \"NITRU\", \"COLORU\", \"PHUR\", \"LABCAST\", \"WBCUA\", \"RBCUA\", \"MUCUS\", \"TRICHOMONAS\", \"YEAST\", \"BACTERIA\", \"CLARITYU\", \"SPECGRAV\", \"LEUKOCYTESUR\", \"UROBILINOGEN\", \"BILIRUBINUR\", \"BLOODU\", \"GLUCOSEU\", \"KETUA\", \"AMORPHOUS\"  Urine Cultures: No results found for: \"LABURIN\"  Blood Cultures: No results found for: \"BC\"  No results found for: \"BLOODCULT2\"  Organism: No results found for: \"ORG\"    Time Spent Discharging patient 35 minutes    Electronically signed by RACHELL Mast CNP on 10/14/2023 at 11:08 AM

## 2023-10-14 NOTE — H&P
History and Physical      Name:  Isaac  /Age/Sex: 1945  (76 y.o. female)   MRN & CSN:  [id number] & [id number] Encounter Date/Time: 10/13/2023 8:35 PM EDT   Location:  ED15/ED-15 PCP: Juan J Matias MD       Hospital Day: 1    Assessment and Plan:     Patient is a 78-year-old female who presented with elevated BP. # Chest pain likely secondary to uncontrolled essential hypertension  - Endorsed elevated BP at home for past 3 weeks with upper chest and neck pain, unclear if related to exertion/rest.  - BP >200/80 on arrival to ED, improved after 1 dose of IV Labetolol with significant improvement. Resume home Lisinopril (titrated to 20 mg), Imdur and Toprol-XL, titrate as needed. - Tn negative, repeat. ECG without acute ischemic changes. CTH and CXR nonacute. - Cardiology consulted for additional input per patient's request.    # CAD s/p PTCA   - Continue ASA and statin. # GERD  - Continue home PPI. Checklist:  Advanced directive: full  Diet: cardiac  DVT ppx: Lovenox    Disposition: place in observation. Estimated discharge: 1 day(s). Current living situation: home. Expected disposition: home. Spoke with ED provider who recommended admission for the patient and I agree with that plan. Personally reviewed lab studies and imaging. EKG interpreted personally and results as stated above. Imaging that was interpreted personally and results as stated above. History of Present Illness:     Chief Complaint: Elevated BP    Patient is a 78-year-old female with a PMHx of CAD s/p PTCA, HTN, GERD and IBS who presented to the ED with elevated BP at home for past 3 weeks with upper chest and neck pain, unclear if related to exertion/rest. Denied any fevers, chills, CP, cough, N/V, abdominal pain, C/D or urinary changes. Denied any tobacco or alcohol use. Currently taking 10 mg of Lisinopril. History obtained from: patient.     ROS:     Pertinent positives and negatives discussed in MG tablet Take 1 tablet by mouth every 7 days 5/11/15   Karla Leblanc MD   Omega-3 Fatty Acids (FISH OIL) 1000 MG CAPS Take 1 capsule by mouth 2 times daily    Karla Leblanc MD   Multiple Vitamins-Minerals (MULTIVITAMIN PO) Take 1 tablet by mouth daily    Karla Leblanc MD   omeprazole (PRILOSEC) 40 MG capsule Take 1 capsule by mouth daily    Karla Leblanc MD   calcium carbonate 600 MG TABS tablet Take 1 tablet by mouth 2 times daily    Karla Leblanc MD   pravastatin (PRAVACHOL) 40 MG tablet Take 1 tablet by mouth daily. 12/7/12   Sendy Hudson MD   aspirin 81 MG tablet Take 1 tablet by mouth daily    Karla Leblanc MD       Medications:     Medications:        Infusions:       PRN Meds:       Data:     CBC:   Recent Labs     10/13/23  1647   WBC 6.5   HGB 12.3*      MCV 87.7   RDW 13.0   LYMPHOPCT 20.8*   MONOPCT 8.3*   BASOPCT 0.8   MONOSABS 0.5   LYMPHSABS 1.4   EOSABS 0.2   BASOSABS 0.1     CMP:    Recent Labs     10/13/23  1647      K 3.8      CO2 26   BUN 20   CREATININE 0.6   GLUCOSE 105*   LABALBU 4.7   CALCIUM 10.2   BILITOT 0.3   ALKPHOS 75   AST 23   ALT 15     Lipids:   Lab Results   Component Value Date/Time    CHOL 1465 03/18/2019 12:00 AM    HDL 38 03/18/2019 12:00 AM    TRIG 167 03/18/2019 12:00 AM     Hemoglobin A1C: No results found for: \"LABA1C\"  TSH: No results found for: \"TSH\"  Troponin:   Lab Results   Component Value Date/Time    TROPONINT <0.010 10/17/2021 10:00 AM     BNP: No results for input(s): \"PROBNP\" in the last 72 hours. Lactic Acid: No results for input(s): \"LACTA\" in the last 72 hours.   UA:No results found for: \"NITRU\", \"COLORU\", \"PHUR\", \"LABCAST\", \"WBCUA\", \"RBCUA\", \"MUCUS\", \"TRICHOMONAS\", \"YEAST\", \"BACTERIA\", \"CLARITYU\", \"SPECGRAV\", \"LEUKOCYTESUR\", \"UROBILINOGEN\", \"BILIRUBINUR\", \"BLOODU\", \"GLUCOSEU\", \"KETUA\", \"AMORPHOUS\"  Urine Cultures: No results found for: \"LABURIN\"  Blood Cultures: No results found for: \"BC\"  No

## 2023-10-16 ENCOUNTER — TELEPHONE (OUTPATIENT)
Dept: CARDIOLOGY CLINIC | Age: 78
End: 2023-10-16

## 2023-10-16 DIAGNOSIS — I10 HYPERTENSION, UNSPECIFIED TYPE: Primary | ICD-10-CM

## 2023-10-16 DIAGNOSIS — I25.10 CORONARY ARTERY DISEASE INVOLVING NATIVE CORONARY ARTERY OF NATIVE HEART WITHOUT ANGINA PECTORIS: ICD-10-CM

## 2023-10-16 NOTE — TELEPHONE ENCOUNTER
Patient called she was see in [facility] last night   Today her bp 181/93 , she was advised to call and set up a stress test . She did not know which kind of stress

## 2023-10-17 ENCOUNTER — PROCEDURE VISIT (OUTPATIENT)
Dept: CARDIOLOGY CLINIC | Age: 78
End: 2023-10-17

## 2023-10-17 DIAGNOSIS — R06.02 SOB (SHORTNESS OF BREATH): ICD-10-CM

## 2023-10-17 DIAGNOSIS — I25.10 CORONARY ARTERY DISEASE INVOLVING NATIVE CORONARY ARTERY OF NATIVE HEART WITHOUT ANGINA PECTORIS: ICD-10-CM

## 2023-10-17 DIAGNOSIS — R94.31 ABNORMAL EKG: Primary | ICD-10-CM

## 2023-10-17 DIAGNOSIS — I10 HYPERTENSION, UNSPECIFIED TYPE: ICD-10-CM

## 2023-10-17 NOTE — PROGRESS NOTES
11:18 AM    10/17/23    Site: antecubital right, condition patent and no redness    # of attempts: 1

## 2023-10-18 ENCOUNTER — TELEPHONE (OUTPATIENT)
Dept: CARDIOLOGY CLINIC | Age: 78
End: 2023-10-18

## 2023-10-18 NOTE — TELEPHONE ENCOUNTER
Normal tracer uptake in all segments of myocardium on stress ans rest   images. Normal Lexiscan nuclear scintigraphic study suggestive of normal   myocardial perfusion. Gated images demonstrate normal left ventricular   systolic function with EF of 60 %. Recommendation   Continue present medical therapy and followup in office as scheduled     Left message of normal results.

## 2023-10-24 NOTE — TELEPHONE ENCOUNTER
Patient states 191/90, this is normal rising B/P comes down after meds - sys 140's but rises in afternoon to 160.170\"s.  Patient is taking Metoprolol 50mg qd, Lisinopril 20mg BID

## 2023-10-24 NOTE — TELEPHONE ENCOUNTER
Patient called stating that her blood pressure has never really leveled out and today is 191/91, please call her back at ph # [phone].

## 2023-10-25 RX ORDER — METOPROLOL SUCCINATE 50 MG/1
50 TABLET, EXTENDED RELEASE ORAL 2 TIMES DAILY
Qty: 60 TABLET | Refills: 5 | Status: SHIPPED | OUTPATIENT
Start: 2023-10-25

## 2023-11-07 ENCOUNTER — TELEPHONE (OUTPATIENT)
Dept: CARDIOLOGY CLINIC | Age: 78
End: 2023-11-07

## 2023-11-07 DIAGNOSIS — I10 HYPERTENSION, UNSPECIFIED TYPE: Primary | ICD-10-CM

## 2023-11-07 RX ORDER — CHLORTHALIDONE 25 MG/1
25 TABLET ORAL DAILY
Qty: 30 TABLET | Refills: 3 | Status: SHIPPED | OUTPATIENT
Start: 2023-11-07

## 2023-11-07 NOTE — TELEPHONE ENCOUNTER
Spoke to [employer], she added medication and ordered labs, returned call informed pt of both and let them know they could  lab order at the office

## 2023-11-20 ENCOUNTER — OFFICE VISIT (OUTPATIENT)
Dept: CARDIOLOGY CLINIC | Age: 78
End: 2023-11-20
Payer: COMMERCIAL

## 2023-11-20 VITALS
HEART RATE: 62 BPM | WEIGHT: 131 LBS | OXYGEN SATURATION: 98 % | HEIGHT: 62 IN | DIASTOLIC BLOOD PRESSURE: 60 MMHG | SYSTOLIC BLOOD PRESSURE: 122 MMHG | BODY MASS INDEX: 24.11 KG/M2

## 2023-11-20 DIAGNOSIS — I10 HYPERTENSION, UNSPECIFIED TYPE: Primary | ICD-10-CM

## 2023-11-20 PROCEDURE — 3074F SYST BP LT 130 MM HG: CPT | Performed by: INTERNAL MEDICINE

## 2023-11-20 PROCEDURE — 3078F DIAST BP <80 MM HG: CPT | Performed by: INTERNAL MEDICINE

## 2023-11-20 PROCEDURE — 99214 OFFICE O/P EST MOD 30 MIN: CPT | Performed by: INTERNAL MEDICINE

## 2023-11-20 PROCEDURE — 1123F ACP DISCUSS/DSCN MKR DOCD: CPT | Performed by: INTERNAL MEDICINE

## 2023-11-20 NOTE — PROGRESS NOTES
CARDIOLOGY NOTE      11/20/2023    RE: Hailey Luo  (1945)                               TO:  Dr. Marco Silveira MD            Lori Best is a 78 y.o. female who was seen today for management of coronary artery disease                              Fu on nuclear      HPI:                   Pt has h/o coronary disease, hypertension, hyperlipidemia, seen today for follow-up. Hailey Luo has the following history recorded in care path:  Patient Active Problem List    Diagnosis Date Noted    HTN (hypertension)     Chest pain 06/07/2013    CAD (coronary artery disease)     History of PTCA 1     Hyperlipidemia     H/O cardiac catheterization     Dyspnea on exertion 10/14/2023     Current Outpatient Medications   Medication Sig Dispense Refill    metoprolol succinate (TOPROL XL) 50 MG extended release tablet Take 1 tablet by mouth in the morning and at bedtime at bedtime. 60 tablet 5    lisinopril (PRINIVIL;ZESTRIL) 20 MG tablet Take 1 tablet by mouth in the morning and at bedtime 60 tablet 5    isosorbide mononitrate (IMDUR) 120 MG extended release tablet Take 1 tablet by mouth daily 90 tablet 3    dicyclomine (BENTYL) 20 MG tablet TAKE 1 TABLET BY MOUTH EVERY 6 HOURS AS NEEDED      L-Lysine 1000 MG TABS by NOT APPLICABLE route      nitroGLYCERIN (NITROSTAT) 0.4 MG SL tablet Place 1 tablet under the tongue every 5 minutes as needed for Chest pain 25 tablet 2    Omega-3 Fatty Acids (FISH OIL) 1000 MG CAPS Take 1 capsule by mouth 2 times daily      Multiple Vitamins-Minerals (MULTIVITAMIN PO) Take 1 tablet by mouth daily      omeprazole (PRILOSEC) 40 MG capsule Take 1 capsule by mouth daily      calcium carbonate 600 MG TABS tablet Take 1 tablet by mouth 2 times daily      pravastatin (PRAVACHOL) 40 MG tablet Take 1 tablet by mouth daily.  90 tablet 3    aspirin 81 MG tablet Take 1 tablet by mouth daily      chlorthalidone (HYGROTON) 25 MG tablet Take 1 tablet by mouth daily (Patient

## 2023-11-20 NOTE — PATIENT INSTRUCTIONS
**It is YOUR responsibilty to bring medication bottles and/or updated medication list to [facility]. This will allow us to better serve you and all your healthcare needs**   [facility] Locations - No appointment necessary. Sites open Monday to Friday. Call your preferred location for test preparation, business   hours and other information you need. [employer] [facility] [employer]. [address] [place] [address]. [address] [place], [facility]  Phone: [phone]    Thank you for allowing us to care for you today! We want to ensure we can follow your treatment plan and we strive to give you the best outcomes and experience possible. If you ever have a life threatening emergency and call 911 - for an ambulance (EMS)   Our providers can only care for you at:   [facility] or [facility]. Even if you have someone take you or you drive yourself we can only care for you in a [facility] facility. Our providers are not setup at the other healthcare locations! Please be informed that if you contact our office outside of normal business hours the physician on call cannot help with any scheduling or rescheduling issues, procedure instruction questions or any type of medication issue. We advise you for any urgent/emergency that you go to the nearest emergency room! PLEASE CALL OUR OFFICE DURING NORMAL BUSINESS HOURS    Monday - Friday   8 am to 5 pm    [place]: [address]: [phone]    [place]:  [phone]  We are committed to providing you the best care possible. If you receive a survey after visiting one of our offices, please take time to share your experience concerning your physician office visit. These surveys are confidential and no health information about you is shared. We are eager to improve for you and we are counting on your feedback to help make that happen.

## 2024-04-22 RX ORDER — LISINOPRIL 20 MG/1
20 TABLET ORAL 2 TIMES DAILY
Qty: 180 TABLET | Refills: 1 | Status: SHIPPED | OUTPATIENT
Start: 2024-04-22

## 2024-05-22 ENCOUNTER — OFFICE VISIT (OUTPATIENT)
Dept: CARDIOLOGY CLINIC | Age: 79
End: 2024-05-22
Payer: COMMERCIAL

## 2024-05-22 VITALS
HEART RATE: 57 BPM | SYSTOLIC BLOOD PRESSURE: 144 MMHG | WEIGHT: 136.2 LBS | HEIGHT: 62 IN | OXYGEN SATURATION: 97 % | DIASTOLIC BLOOD PRESSURE: 64 MMHG | BODY MASS INDEX: 25.06 KG/M2

## 2024-05-22 DIAGNOSIS — E78.5 HYPERLIPIDEMIA, UNSPECIFIED HYPERLIPIDEMIA TYPE: ICD-10-CM

## 2024-05-22 DIAGNOSIS — I25.10 CORONARY ARTERY DISEASE INVOLVING NATIVE CORONARY ARTERY OF NATIVE HEART WITHOUT ANGINA PECTORIS: ICD-10-CM

## 2024-05-22 DIAGNOSIS — I10 HYPERTENSION, UNSPECIFIED TYPE: Primary | ICD-10-CM

## 2024-05-22 PROCEDURE — 99214 OFFICE O/P EST MOD 30 MIN: CPT | Performed by: NURSE PRACTITIONER

## 2024-05-22 PROCEDURE — 93000 ELECTROCARDIOGRAM COMPLETE: CPT | Performed by: NURSE PRACTITIONER

## 2024-05-22 PROCEDURE — 3077F SYST BP >= 140 MM HG: CPT | Performed by: NURSE PRACTITIONER

## 2024-05-22 PROCEDURE — 1123F ACP DISCUSS/DSCN MKR DOCD: CPT | Performed by: NURSE PRACTITIONER

## 2024-05-22 PROCEDURE — 3078F DIAST BP <80 MM HG: CPT | Performed by: NURSE PRACTITIONER

## 2024-05-22 RX ORDER — AMLODIPINE BESYLATE 5 MG/1
5 TABLET ORAL DAILY
Qty: 90 TABLET | Refills: 1 | Status: SHIPPED | OUTPATIENT
Start: 2024-05-22

## 2024-05-22 RX ORDER — ISOSORBIDE MONONITRATE 120 MG/1
120 TABLET, EXTENDED RELEASE ORAL DAILY
Qty: 90 TABLET | Refills: 3 | Status: SHIPPED | OUTPATIENT
Start: 2024-05-22

## 2024-05-22 ASSESSMENT — ENCOUNTER SYMPTOMS
ORTHOPNEA: 0
SHORTNESS OF BREATH: 0

## 2024-05-22 NOTE — PROGRESS NOTES
warm and dry.      Capillary Refill: Capillary refill takes less than 2 seconds.   Neurological:      Mental Status: She is alert and oriented to person, place, and time.                Current Outpatient Medications   Medication Sig Dispense Refill    lisinopril (PRINIVIL;ZESTRIL) 20 MG tablet Take 1 tablet by mouth in the morning and at bedtime 180 tablet 1    metoprolol succinate (TOPROL XL) 50 MG extended release tablet Take 1 tablet by mouth in the morning and at bedtime at bedtime. 60 tablet 5    isosorbide mononitrate (IMDUR) 120 MG extended release tablet Take 1 tablet by mouth daily 90 tablet 3    dicyclomine (BENTYL) 20 MG tablet TAKE 1 TABLET BY MOUTH EVERY 6 HOURS AS NEEDED      L-Lysine 1000 MG TABS by NOT APPLICABLE route      nitroGLYCERIN (NITROSTAT) 0.4 MG SL tablet Place 1 tablet under the tongue every 5 minutes as needed for Chest pain 25 tablet 2    Omega-3 Fatty Acids (FISH OIL) 1000 MG CAPS Take 1 capsule by mouth 2 times daily      Multiple Vitamins-Minerals (MULTIVITAMIN PO) Take 1 tablet by mouth daily      omeprazole (PRILOSEC) 40 MG capsule Take 1 capsule by mouth daily      calcium carbonate 600 MG TABS tablet Take 1 tablet by mouth 2 times daily      pravastatin (PRAVACHOL) 40 MG tablet Take 1 tablet by mouth daily. 90 tablet 3    aspirin 81 MG tablet Take 1 tablet by mouth daily      chlorthalidone (HYGROTON) 25 MG tablet Take 1 tablet by mouth daily (Patient not taking: Reported on 11/20/2023) 30 tablet 3    alendronate (FOSAMAX) 70 MG tablet Take 1 tablet by mouth every 7 days (Patient not taking: Reported on 5/22/2024)       No current facility-administered medications for this visit.          All pertinent data reviewed and discussed with patient       ASSESSMENT/PLAN:    Coronary artery disease  Remains stable  EKG today shows sinus rhythm no acute ST or T wave abnormalities.  Continue with Imdur and aspirin.      Hypertension  Blood pressure is running high.  Add amlodipine 5 mg

## 2024-05-26 NOTE — PROGRESS NOTES
CARDIOLOGY NOTE      5/8/2023    RE: Natty Graves  (1945)                               TO:  Dr. Gerhardt Hoffmann, MD            CHIEF COMPLAINT   Kathlyne Severs is a 76 y.o. female who was seen today for management of coronary artery disease                                    HPI:                   Pt has h/o coronary disease, hypertension, hyperlipidemia, seen today for follow-up. BP is elevated  Natty Graves has the following history recorded in care path:  Patient Active Problem List    Diagnosis Date Noted    HTN (hypertension)     Chest pain 06/07/2013    CAD (coronary artery disease)     History of PTCA 1     Hyperlipidemia     H/O cardiac catheterization      Current Outpatient Medications   Medication Sig Dispense Refill    isosorbide mononitrate (IMDUR) 120 MG extended release tablet Take 1 tablet by mouth daily 90 tablet 3    dicyclomine (BENTYL) 20 MG tablet TAKE 1 TABLET BY MOUTH EVERY 6 HOURS AS NEEDED      metoprolol succinate (TOPROL XL) 50 MG extended release tablet TAKE 1 TABLET BY MOUTH AT BEDTIME      L-Lysine 1000 MG TABS by NOT APPLICABLE route      lisinopril (PRINIVIL;ZESTRIL) 5 MG tablet Take 0.5 tablets by mouth daily      alendronate (FOSAMAX) 70 MG tablet Take 1 tablet by mouth every 7 days      Omega-3 Fatty Acids (FISH OIL) 1000 MG CAPS Take 1 capsule by mouth 2 times daily      Multiple Vitamins-Minerals (MULTIVITAMIN PO) Take 1 tablet by mouth daily      omeprazole (PRILOSEC) 40 MG capsule Take 1 capsule by mouth daily      calcium carbonate 600 MG TABS tablet Take 1 tablet by mouth 2 times daily      pravastatin (PRAVACHOL) 40 MG tablet Take 1 tablet by mouth daily. 90 tablet 3    aspirin 81 MG tablet Take 1 tablet by mouth daily      nitroGLYCERIN (NITROSTAT) 0.4 MG SL tablet Place 1 tablet under the tongue every 5 minutes as needed for Chest pain (Patient not taking: Reported on 5/8/2023) 25 tablet 2     No current facility-administered medications for this visit.
Patient requests all Lab, Cardiology, and Radiology Results on their Discharge Instructions

## 2024-06-25 ENCOUNTER — TELEPHONE (OUTPATIENT)
Dept: CARDIOLOGY CLINIC | Age: 79
End: 2024-06-25

## 2024-06-25 NOTE — TELEPHONE ENCOUNTER
Patient called concerned about her heart rate   Started at 88 went to 96 is now coming down   To 77 just wasn't feeling good please advise

## 2024-06-25 NOTE — TELEPHONE ENCOUNTER
Patient had a period of time heart rate fluctuating, palpitations. Patient is fine now but scheduled OV to assess

## 2024-06-26 ENCOUNTER — OFFICE VISIT (OUTPATIENT)
Dept: CARDIOLOGY CLINIC | Age: 79
End: 2024-06-26
Payer: COMMERCIAL

## 2024-06-26 VITALS
WEIGHT: 134.6 LBS | HEIGHT: 62 IN | SYSTOLIC BLOOD PRESSURE: 108 MMHG | DIASTOLIC BLOOD PRESSURE: 64 MMHG | HEART RATE: 62 BPM | OXYGEN SATURATION: 94 % | BODY MASS INDEX: 24.77 KG/M2

## 2024-06-26 DIAGNOSIS — R00.2 PALPITATIONS: Primary | ICD-10-CM

## 2024-06-26 PROCEDURE — 3074F SYST BP LT 130 MM HG: CPT | Performed by: NURSE PRACTITIONER

## 2024-06-26 PROCEDURE — 1123F ACP DISCUSS/DSCN MKR DOCD: CPT | Performed by: NURSE PRACTITIONER

## 2024-06-26 PROCEDURE — 99213 OFFICE O/P EST LOW 20 MIN: CPT | Performed by: NURSE PRACTITIONER

## 2024-06-26 PROCEDURE — 3078F DIAST BP <80 MM HG: CPT | Performed by: NURSE PRACTITIONER

## 2024-06-26 ASSESSMENT — ENCOUNTER SYMPTOMS
ORTHOPNEA: 0
SHORTNESS OF BREATH: 0

## 2024-06-26 NOTE — PROGRESS NOTES
6/26/2024  Primary cardiologist: Dr. Browne    CC:   Ambar  is an established 78 y.o.  female here for a follow up on palpitation      SUBJECTIVE/OBJECTIVE:  JOSEPH Villalta is a 78 y.o. female with a history of coronary artery disease, PCI of LAD/ Diag, hypertension and hyperlipidemia.    Ambar reports she woke up yesterday morning with heart rate up into the 80s.  States that it went dropped to about 90 and lasted for about 3 hours.  States this is unusual for show generally has a resting heart rate in the 60s.  She had no associated symptoms.  She has been compliant with medications.  She is keeping herself well-hydrated.      Review of Systems   Constitutional: Negative for diaphoresis and malaise/fatigue.   Cardiovascular:  Positive for palpitations. Negative for chest pain, claudication, dyspnea on exertion, irregular heartbeat, leg swelling, near-syncope, orthopnea and paroxysmal nocturnal dyspnea.   Respiratory:  Negative for shortness of breath.    Neurological:  Negative for dizziness and light-headedness.       Vitals:    06/26/24 0943   BP: 108/64   Site: Left Upper Arm   Position: Sitting   Cuff Size: Medium Adult   Pulse: 62   SpO2: 94%   Weight: 61.1 kg (134 lb 9.6 oz)   Height: 1.575 m (5' 2\")     Wt Readings from Last 3 Encounters:   06/26/24 61.1 kg (134 lb 9.6 oz)   05/22/24 61.8 kg (136 lb 3.2 oz)   11/20/23 59.4 kg (131 lb)      Body mass index is 24.62 kg/m².     Physical Exam  Vitals reviewed.   Eyes:      Pupils: Pupils are equal, round, and reactive to light.   Neck:      Vascular: No carotid bruit.   Cardiovascular:      Rate and Rhythm: Normal rate and regular rhythm.      Pulses: Normal pulses.   Pulmonary:      Effort: Pulmonary effort is normal.      Breath sounds: Normal breath sounds.   Musculoskeletal:      Cervical back: No tenderness.   Skin:     General: Skin is warm and dry.   Neurological:      Mental Status: She is alert and oriented to person, place, and time.

## 2024-06-26 NOTE — PATIENT INSTRUCTIONS
**It is YOUR responsibilty to bring medication bottles and/or updated medication list to EACH APPOINTMENT. This will allow us to better serve you and all your healthcare needs**   Thank you for allowing us to care for you today!   We want to ensure we can follow your treatment plan and we strive to give you the best outcomes and experience possible.   If you ever have a life threatening emergency and call 911 - for an ambulance (EMS)   Our providers can only care for you at:   [facility] or [facility].   Even if you have someone take you or you drive yourself we can only care for you in a [facility]. Our providers are not setup at the other healthcare locations!   Please be informed that if you contact our office outside of normal business hours the physician on call cannot help with any scheduling or rescheduling issues, procedure instruction questions or any type of medication issue.    We advise you for any urgent/emergency that you go to the nearest emergency room!    PLEASE CALL OUR OFFICE DURING NORMAL BUSINESS HOURS    Monday - Friday   8 am to 5 pm    [place]: [phone]    [place]: [phone]    [place]:  [phone]  We are committed to providing you the best care possible.    If you receive a survey after visiting one of our offices, please take time to share your experience concerning your physician office visit.  These surveys are confidential and no health information about you is shared.    We are eager to improve for you and we are counting on your feedback to help make that happen.

## 2024-10-15 RX ORDER — LISINOPRIL 20 MG/1
20 TABLET ORAL 2 TIMES DAILY
Qty: 180 TABLET | Refills: 1 | Status: SHIPPED | OUTPATIENT
Start: 2024-10-15

## 2024-11-15 ENCOUNTER — TELEPHONE (OUTPATIENT)
Dept: CARDIOLOGY CLINIC | Age: 79
End: 2024-11-15

## 2024-11-15 NOTE — TELEPHONE ENCOUNTER
Lm asking about monitor. Per robbin's note monitor was to only be placed if palpitations continued. Please advise

## 2024-11-18 ENCOUNTER — OFFICE VISIT (OUTPATIENT)
Dept: CARDIOLOGY CLINIC | Age: 79
End: 2024-11-18

## 2024-11-18 VITALS
HEIGHT: 63 IN | BODY MASS INDEX: 24.06 KG/M2 | HEART RATE: 68 BPM | WEIGHT: 135.8 LBS | DIASTOLIC BLOOD PRESSURE: 54 MMHG | SYSTOLIC BLOOD PRESSURE: 118 MMHG

## 2024-11-18 DIAGNOSIS — Z98.61 HISTORY OF PTCA 1: Primary | ICD-10-CM

## 2024-11-18 NOTE — PROGRESS NOTES
note this report has been partially produced using speech recognition software and may contain errors related to that system including errors in grammar, punctuation, and spelling, as well as words and phrases that may be inappropriate. If there are any questions or concerns please feel free to contact the dictating provider for clarification.

## 2024-11-18 NOTE — PATIENT INSTRUCTIONS
**It is YOUR responsibilty to bring medication bottles and/or updated medication list to EACH APPOINTMENT. This will allow us to better serve you and all your healthcare needs**  Thank you for allowing us to care for you today!   We want to ensure we can follow your treatment plan and we strive to give you the best outcomes and experience possible.   If you ever have a life threatening emergency and call 911 - for an ambulance (EMS)   Our providers can only care for you at:   [facility] or [facility].   Even if you have someone take you or you drive yourself we can only care for you in a [facility]. Our providers are not setup at the other healthcare locations!   Please be informed that if you contact our office outside of normal business hours the physician on call cannot help with any scheduling or rescheduling issues, procedure instruction questions or any type of medication issue.    We advise you for any urgent/emergency that you go to the nearest emergency room!    PLEASE CALL OUR OFFICE DURING NORMAL BUSINESS HOURS    Monday - Friday   8 am to 5 pm    [place]: [phone]    [place]: [phone]    [place]:  [phone]  We are committed to providing you the best care possible.    If you receive a survey after visiting one of our offices, please take time to share your experience concerning your physician office visit.  These surveys are confidential and no health information about you is shared.    We are eager to improve for you and we are counting on your feedback to help make that happen.

## 2024-11-20 RX ORDER — AMLODIPINE BESYLATE 5 MG/1
5 TABLET ORAL DAILY
Qty: 90 TABLET | Refills: 1 | Status: SHIPPED | OUTPATIENT
Start: 2024-11-20

## 2025-04-23 RX ORDER — LISINOPRIL 20 MG/1
20 TABLET ORAL 2 TIMES DAILY
Qty: 180 TABLET | Refills: 1 | Status: SHIPPED | OUTPATIENT
Start: 2025-04-23

## 2025-05-28 ENCOUNTER — OFFICE VISIT (OUTPATIENT)
Dept: CARDIOLOGY CLINIC | Age: 80
End: 2025-05-28

## 2025-05-28 VITALS
BODY MASS INDEX: 23.53 KG/M2 | HEART RATE: 58 BPM | HEIGHT: 63 IN | WEIGHT: 132.8 LBS | DIASTOLIC BLOOD PRESSURE: 74 MMHG | SYSTOLIC BLOOD PRESSURE: 138 MMHG

## 2025-05-28 DIAGNOSIS — I25.10 CORONARY ARTERY DISEASE INVOLVING NATIVE CORONARY ARTERY OF NATIVE HEART WITHOUT ANGINA PECTORIS: ICD-10-CM

## 2025-05-28 DIAGNOSIS — R00.2 PALPITATIONS: Primary | ICD-10-CM

## 2025-05-28 DIAGNOSIS — E78.5 HYPERLIPIDEMIA, UNSPECIFIED HYPERLIPIDEMIA TYPE: ICD-10-CM

## 2025-05-28 DIAGNOSIS — I10 HYPERTENSION, UNSPECIFIED TYPE: ICD-10-CM

## 2025-05-28 PROCEDURE — 1159F MED LIST DOCD IN RCRD: CPT | Performed by: NURSE PRACTITIONER

## 2025-05-28 PROCEDURE — 3078F DIAST BP <80 MM HG: CPT | Performed by: NURSE PRACTITIONER

## 2025-05-28 PROCEDURE — 93000 ELECTROCARDIOGRAM COMPLETE: CPT | Performed by: NURSE PRACTITIONER

## 2025-05-28 PROCEDURE — 99214 OFFICE O/P EST MOD 30 MIN: CPT | Performed by: NURSE PRACTITIONER

## 2025-05-28 PROCEDURE — 1123F ACP DISCUSS/DSCN MKR DOCD: CPT | Performed by: NURSE PRACTITIONER

## 2025-05-28 PROCEDURE — 3075F SYST BP GE 130 - 139MM HG: CPT | Performed by: NURSE PRACTITIONER

## 2025-05-28 ASSESSMENT — ENCOUNTER SYMPTOMS
SHORTNESS OF BREATH: 0
ORTHOPNEA: 0

## 2025-05-28 NOTE — PROGRESS NOTES
5/28/2025  Primary cardiologist: Dr. Browne    CC:   Ambar  is an established 79 y.o.  female here for a follow up on coronary artery disease      SUBJECTIVE/OBJECTIVE:  Ambar is a 79 y.o. female with a history of coronary artery disease, PCI of LAD/ Diag, hypertension and hyperlipidemia     HPI:  Ambar states she has been feeling well.  She recently had a mechanical fall.  States she tripped over the sidewalk.  No LOC.  She has had no chest pain or shortness of breath since her last visit.  She does try to keep her self pretty active.    Review of Systems   Constitutional: Negative for diaphoresis and malaise/fatigue.   Cardiovascular:  Negative for chest pain, claudication, dyspnea on exertion, irregular heartbeat, leg swelling, near-syncope, orthopnea, palpitations and paroxysmal nocturnal dyspnea.   Respiratory:  Negative for shortness of breath.    Musculoskeletal:  Positive for falls (trip/ mechanical fall).   Neurological:  Negative for dizziness and light-headedness.       Vitals:    05/28/25 0904   BP: 138/74   BP Site: Left Upper Arm   Patient Position: Sitting   BP Cuff Size: Medium Adult   Pulse: 58   Weight: 60.2 kg (132 lb 12.8 oz)   Height: 1.6 m (5' 3\")     Wt Readings from Last 3 Encounters:   05/28/25 60.2 kg (132 lb 12.8 oz)   11/18/24 61.6 kg (135 lb 12.8 oz)   06/26/24 61.1 kg (134 lb 9.6 oz)      Body mass index is 23.52 kg/m².     Physical Exam  Vitals reviewed.   Eyes:      Pupils: Pupils are equal, round, and reactive to light.   Cardiovascular:      Rate and Rhythm: Regular rhythm. Bradycardia present.      Pulses: Normal pulses.   Pulmonary:      Effort: Pulmonary effort is normal.      Breath sounds: No rales.   Musculoskeletal:      Right lower leg: No edema.      Left lower leg: No edema.   Skin:     General: Skin is warm and dry.      Capillary Refill: Capillary refill takes less than 2 seconds.   Neurological:      Mental Status: She is alert and oriented to person, place, and

## 2025-05-28 NOTE — PATIENT INSTRUCTIONS
Thank you for allowing us to care for you today!   We want to ensure we can follow your treatment plan and we strive to give you the best outcomes and experience possible.   If you ever have a life threatening emergency and call 911 - for an ambulance (EMS)  REMEMBER  Our providers can only care for you at:   [facility] or [facility]   Even if you have someone take you or you drive yourself we can only care for you in a [facility] facility. Our providers are not setup at the other healthcare locations!    PLEASE CALL OUR OFFICE DURING NORMAL BUSINESS HOURS  Monday through Friday 8 am to 5 pm  AFTER HOURS the physician on-call cannot help with scheduling, rescheduling, procedure instruction questions or any type of medication need or issue.  [facility] P:[phone] - [facility] P:[phone] - [facility] P:[phone]      If you receive a survey:  We would appreciate you taking the time to share your experience concerning your provider visit in the office.    These surveys are confidential!  We are eager to improve and are counting on you to share your feedback so we can ensure you get the best care possible.

## 2025-05-28 NOTE — PROGRESS NOTES
CLINICAL STAFF DOCUMENTATION    Riddhi Davies, SHEELA     Ambar JOE Yulisa  1945  33    Have you had any Chest Pain recently? - No    Have you had any Shortness of Breath - No  Have you had any dizziness - No  Have you had any palpitations recently? - No    Do you have any edema - swelling in  if she is on her feet all day, no change.          When did you have your last labs drawn 5/2024  What doctor ordered [place]   Do we have the labs in their chart  in care everywhere   Do you need any prescriptions refilled? - No    Do you have a surgery or procedure scheduled in the near future - No      Do use tobacco products? - No  Do you drink alcohol? -  occ  Do you use any illicit drugs? - No  Caffeine? - No    Check medication list thoroughly!!! AND RECONCILE OUTSIDE MEDICATIONS  If dose has changed change the entire order not just the MG  BE SURE TO ASK PATIENT IF THEY NEED MEDICATION REFILLS  Verify Pharmacy and update if incorrect    Add to every patient's \"wrap up\" the following dot phrase AFTERVISITCARDIOHEARTHOUSE and ensure we explain this to our patients

## (undated) DEVICE — LINE SAMP O2 6.5FT W/FEMALE CONN F/ADULT CAPNOLINE PLUS

## (undated) DEVICE — LINER SUCT CANSTR 1500CC SEMI RIG W/ POR HYDROPHOBIC SHUT

## (undated) DEVICE — BW-412T DISP COMBO CLEANING BRUSH: Brand: SINGLE USE COMBINATION CLEANING BRUSH

## (undated) DEVICE — FORCEPS BX L240CM JAW DIA2.8MM L CAP W/ NDL MIC MESH TOOTH

## (undated) DEVICE — TUBING, SUCTION, 3/16" X 6', STRAIGHT: Brand: MEDLINE

## (undated) DEVICE — THE TORRENT IRRIGATION SCOPE CONNECTOR IS USED WITH THE TORRENT IRRIGATION TUBING TO PROVIDE IRRIGATION FLUIDS SUCH AS STERILE WATER DURING GASTROINTESTINAL ENDOSCOPIC PROCEDURES WHEN USED IN CONJUNCTION WITH AN IRRIGATION PUMP (OR ELECTROSURGICAL UNIT).: Brand: TORRENT

## (undated) DEVICE — KENDALL 500 SERIES DIAPHORETIC FOAM MONITORING ELECTRODE - TEAR DROP SHAPE ( 30/PK): Brand: KENDALL

## (undated) DEVICE — JELLY LUBRICATING 3 GM BACTERIOSTATIC

## (undated) DEVICE — ACUSNARE POLYPECTOMY SNARE: Brand: ACUSNARE